# Patient Record
Sex: MALE | Race: WHITE | Employment: UNEMPLOYED | URBAN - METROPOLITAN AREA
[De-identification: names, ages, dates, MRNs, and addresses within clinical notes are randomized per-mention and may not be internally consistent; named-entity substitution may affect disease eponyms.]

---

## 2018-10-03 ENCOUNTER — APPOINTMENT (OUTPATIENT)
Dept: GENERAL RADIOLOGY | Age: 28
End: 2018-10-03
Payer: MEDICAID

## 2018-10-03 ENCOUNTER — HOSPITAL ENCOUNTER (EMERGENCY)
Age: 28
Discharge: HOME | End: 2018-10-03
Payer: MEDICAID

## 2018-10-03 ENCOUNTER — APPOINTMENT (OUTPATIENT)
Dept: ULTRASOUND IMAGING | Age: 28
End: 2018-10-03
Payer: MEDICAID

## 2018-10-03 VITALS
DIASTOLIC BLOOD PRESSURE: 57 MMHG | HEART RATE: 87 BPM | RESPIRATION RATE: 16 BRPM | BODY MASS INDEX: 24.25 KG/M2 | SYSTOLIC BLOOD PRESSURE: 99 MMHG | HEIGHT: 68 IN | TEMPERATURE: 98 F | OXYGEN SATURATION: 97 % | WEIGHT: 160 LBS

## 2018-10-03 DIAGNOSIS — R06.00 DYSPNEA, UNSPECIFIED TYPE: ICD-10-CM

## 2018-10-03 DIAGNOSIS — N43.3 HYDROCELE IN ADULT: ICD-10-CM

## 2018-10-03 DIAGNOSIS — N50.0 BILATERAL TESTICULAR ATROPHY: Primary | ICD-10-CM

## 2018-10-03 DIAGNOSIS — N39.0 URINARY TRACT INFECTION WITHOUT HEMATURIA, SITE UNSPECIFIED: ICD-10-CM

## 2018-10-03 LAB
ALBUMIN SERPL-MCNC: 5.2 G/DL (ref 3.9–4.9)
ALP BLD-CCNC: 79 U/L (ref 35–104)
ALT SERPL-CCNC: 15 U/L (ref 0–41)
AMPHETAMINE SCREEN, URINE: NORMAL
ANION GAP SERPL CALCULATED.3IONS-SCNC: 12 MEQ/L (ref 7–13)
AST SERPL-CCNC: 18 U/L (ref 0–40)
BARBITURATE SCREEN URINE: NORMAL
BASOPHILS ABSOLUTE: 0 K/UL (ref 0–0.2)
BASOPHILS RELATIVE PERCENT: 0.5 %
BENZODIAZEPINE SCREEN, URINE: NORMAL
BILIRUB SERPL-MCNC: 0.4 MG/DL (ref 0–1.2)
BILIRUBIN URINE: NEGATIVE
BLOOD, URINE: NEGATIVE
BUN BLDV-MCNC: 9 MG/DL (ref 6–20)
CALCIUM SERPL-MCNC: 9.7 MG/DL (ref 8.6–10.2)
CANNABINOID SCREEN URINE: NORMAL
CHLORIDE BLD-SCNC: 101 MEQ/L (ref 98–107)
CLARITY: CLEAR
CO2: 29 MEQ/L (ref 22–29)
COCAINE METABOLITE SCREEN URINE: NORMAL
COLOR: YELLOW
CREAT SERPL-MCNC: 0.74 MG/DL (ref 0.7–1.2)
EOSINOPHILS ABSOLUTE: 0.2 K/UL (ref 0–0.7)
EOSINOPHILS RELATIVE PERCENT: 3.6 %
EPITHELIAL CELLS, UA: NORMAL /HPF
ETHANOL PERCENT: NORMAL G/DL
ETHANOL: <10 MG/DL (ref 0–0.08)
GFR AFRICAN AMERICAN: >60
GFR NON-AFRICAN AMERICAN: >60
GLOBULIN: 2.3 G/DL (ref 2.3–3.5)
GLUCOSE BLD-MCNC: 95 MG/DL (ref 74–109)
GLUCOSE URINE: NEGATIVE MG/DL
HCT VFR BLD CALC: 47.9 % (ref 42–52)
HEMOGLOBIN: 16.4 G/DL (ref 14–18)
KETONES, URINE: NEGATIVE MG/DL
LACTIC ACID: 0.7 MMOL/L (ref 0.5–2.2)
LEUKOCYTE ESTERASE, URINE: ABNORMAL
LYMPHOCYTES ABSOLUTE: 2 K/UL (ref 1–4.8)
LYMPHOCYTES RELATIVE PERCENT: 29.6 %
Lab: NORMAL
MCH RBC QN AUTO: 31.9 PG (ref 27–31.3)
MCHC RBC AUTO-ENTMCNC: 34.2 % (ref 33–37)
MCV RBC AUTO: 93.4 FL (ref 80–100)
MONOCYTES ABSOLUTE: 0.6 K/UL (ref 0.2–0.8)
MONOCYTES RELATIVE PERCENT: 9.5 %
NEUTROPHILS ABSOLUTE: 3.8 K/UL (ref 1.4–6.5)
NEUTROPHILS RELATIVE PERCENT: 56.8 %
NITRITE, URINE: NEGATIVE
OPIATE SCREEN URINE: NORMAL
PDW BLD-RTO: 12.5 % (ref 11.5–14.5)
PH UA: 7.5 (ref 5–9)
PHENCYCLIDINE SCREEN URINE: NORMAL
PLATELET # BLD: 202 K/UL (ref 130–400)
POTASSIUM SERPL-SCNC: 3.8 MEQ/L (ref 3.5–5.1)
PROTEIN UA: NEGATIVE MG/DL
RBC # BLD: 5.13 M/UL (ref 4.7–6.1)
RBC UA: NORMAL /HPF (ref 0–2)
RENAL EPITHELIAL, UA: NORMAL /HPF
SODIUM BLD-SCNC: 142 MEQ/L (ref 132–144)
SPECIFIC GRAVITY UA: 1.01 (ref 1–1.03)
TOTAL PROTEIN: 7.5 G/DL (ref 6.4–8.1)
URINE REFLEX TO CULTURE: YES
UROBILINOGEN, URINE: 0.2 E.U./DL
WBC # BLD: 6.7 K/UL (ref 4.8–10.8)
WBC UA: NORMAL /HPF (ref 0–5)

## 2018-10-03 PROCEDURE — 83605 ASSAY OF LACTIC ACID: CPT

## 2018-10-03 PROCEDURE — 87086 URINE CULTURE/COLONY COUNT: CPT

## 2018-10-03 PROCEDURE — 85025 COMPLETE CBC W/AUTO DIFF WBC: CPT

## 2018-10-03 PROCEDURE — 99285 EMERGENCY DEPT VISIT HI MDM: CPT

## 2018-10-03 PROCEDURE — 76870 US EXAM SCROTUM: CPT

## 2018-10-03 PROCEDURE — 2580000003 HC RX 258: Performed by: PHYSICIAN ASSISTANT

## 2018-10-03 PROCEDURE — G0480 DRUG TEST DEF 1-7 CLASSES: HCPCS

## 2018-10-03 PROCEDURE — 87040 BLOOD CULTURE FOR BACTERIA: CPT

## 2018-10-03 PROCEDURE — 80307 DRUG TEST PRSMV CHEM ANLYZR: CPT

## 2018-10-03 PROCEDURE — 80053 COMPREHEN METABOLIC PANEL: CPT

## 2018-10-03 PROCEDURE — 71046 X-RAY EXAM CHEST 2 VIEWS: CPT

## 2018-10-03 PROCEDURE — 36415 COLL VENOUS BLD VENIPUNCTURE: CPT

## 2018-10-03 PROCEDURE — 81001 URINALYSIS AUTO W/SCOPE: CPT

## 2018-10-03 RX ORDER — NITROFURANTOIN 25; 75 MG/1; MG/1
100 CAPSULE ORAL 2 TIMES DAILY
Qty: 10 CAPSULE | Refills: 0 | Status: SHIPPED | OUTPATIENT
Start: 2018-10-03 | End: 2018-10-08

## 2018-10-03 RX ORDER — 0.9 % SODIUM CHLORIDE 0.9 %
1000 INTRAVENOUS SOLUTION INTRAVENOUS ONCE
Status: COMPLETED | OUTPATIENT
Start: 2018-10-03 | End: 2018-10-03

## 2018-10-03 RX ADMIN — SODIUM CHLORIDE 1000 ML: 9 INJECTION, SOLUTION INTRAVENOUS at 10:35

## 2018-10-03 ASSESSMENT — ENCOUNTER SYMPTOMS
ABDOMINAL PAIN: 0
COLOR CHANGE: 0
EYE DISCHARGE: 0
ABDOMINAL DISTENTION: 0
CONSTIPATION: 0
SHORTNESS OF BREATH: 1
RHINORRHEA: 0
SORE THROAT: 0

## 2018-10-03 NOTE — ED PROVIDER NOTES
pain. Negative for difficulty urinating and dysuria. Musculoskeletal: Negative for arthralgias. Skin: Negative for color change. Neurological: Negative for dizziness, syncope, numbness and headaches. Psychiatric/Behavioral: Negative for agitation and confusion. Except as noted above the remainder of the review of systems was reviewed and negative. PAST MEDICAL HISTORY   History reviewed. No pertinent past medical history. SURGICAL HISTORY       Past Surgical History:   Procedure Laterality Date    SINUS SURGERY  2016         CURRENT MEDICATIONS       Previous Medications    No medications on file       ALLERGIES     Patient has no known allergies. FAMILY HISTORY     History reviewed. No pertinent family history. SOCIAL HISTORY       Social History     Social History    Marital status: Single     Spouse name: N/A    Number of children: N/A    Years of education: N/A     Social History Main Topics    Smoking status: Never Smoker    Smokeless tobacco: Never Used    Alcohol use No    Drug use: No    Sexual activity: Not Asked     Other Topics Concern    None     Social History Narrative    None       SCREENINGS    Randolph Coma Scale  Eye Opening: Spontaneous  Best Verbal Response: Oriented  Best Motor Response: Obeys commands  Huey Coma Scale Score: 15        PHYSICAL EXAM    (up to 7 for level 4, 8 or more for level 5)     ED Triage Vitals [10/03/18 0943]   BP Temp Temp Source Pulse Resp SpO2 Height Weight   119/70 97.7 °F (36.5 °C) Temporal 100 18 98 % 5' 8\" (1.727 m) 160 lb (72.6 kg)       Physical Exam   Constitutional: He is oriented to person, place, and time. He appears well-developed and well-nourished. HENT:   Head: Normocephalic. Eyes: Pupils are equal, round, and reactive to light. Neck: Neck supple. No JVD present. No tracheal deviation present. Cardiovascular: Normal rate.     Pulmonary/Chest: Effort normal and breath sounds normal. No respiratory
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
## 2018-10-04 LAB — URINE CULTURE, ROUTINE: NORMAL

## 2018-10-08 LAB
BLOOD CULTURE, ROUTINE: NORMAL
CULTURE, BLOOD 2: NORMAL

## 2019-01-23 ENCOUNTER — OFFICE VISIT (OUTPATIENT)
Dept: INTERNAL MEDICINE CLINIC | Age: 29
End: 2019-01-23
Payer: MEDICAID

## 2019-01-23 VITALS
RESPIRATION RATE: 16 BRPM | OXYGEN SATURATION: 98 % | DIASTOLIC BLOOD PRESSURE: 78 MMHG | TEMPERATURE: 98.7 F | BODY MASS INDEX: 23.88 KG/M2 | HEIGHT: 69 IN | HEART RATE: 78 BPM | SYSTOLIC BLOOD PRESSURE: 120 MMHG | WEIGHT: 161.2 LBS

## 2019-01-23 DIAGNOSIS — K64.9 HEMORRHOIDS, UNSPECIFIED HEMORRHOID TYPE: ICD-10-CM

## 2019-01-23 DIAGNOSIS — K59.09 OTHER CONSTIPATION: Primary | ICD-10-CM

## 2019-01-23 DIAGNOSIS — Z11.4 SCREENING FOR HIV (HUMAN IMMUNODEFICIENCY VIRUS): ICD-10-CM

## 2019-01-23 DIAGNOSIS — E29.1 HYPOGONADISM IN MALE: ICD-10-CM

## 2019-01-23 DIAGNOSIS — R04.0 NASAL BLEEDING: ICD-10-CM

## 2019-01-23 LAB
ESTRADIOL LEVEL: <5 PG/ML
PROLACTIN: 15.1 NG/ML (ref 4–15.2)
TSH SERPL DL<=0.05 MIU/L-ACNC: 3.37 UIU/ML (ref 0.27–4.2)

## 2019-01-23 PROCEDURE — 99204 OFFICE O/P NEW MOD 45 MIN: CPT | Performed by: FAMILY MEDICINE

## 2019-01-23 ASSESSMENT — ENCOUNTER SYMPTOMS
ALLERGIC/IMMUNOLOGIC NEGATIVE: 1
RESPIRATORY NEGATIVE: 1
GASTROINTESTINAL NEGATIVE: 1
SHORTNESS OF BREATH: 0
EYES NEGATIVE: 1

## 2019-01-23 ASSESSMENT — PATIENT HEALTH QUESTIONNAIRE - PHQ9
SUM OF ALL RESPONSES TO PHQ9 QUESTIONS 1 & 2: 0
1. LITTLE INTEREST OR PLEASURE IN DOING THINGS: 0
SUM OF ALL RESPONSES TO PHQ QUESTIONS 1-9: 0
SUM OF ALL RESPONSES TO PHQ QUESTIONS 1-9: 0
2. FEELING DOWN, DEPRESSED OR HOPELESS: 0

## 2019-01-26 LAB
HIV-1 WESTERN BLOT: NEGATIVE
SEX HORMONE BINDING GLOBULIN: 293 NMOL/L (ref 11–80)
TESTOSTERONE FREE PERCENT: 0.3 % (ref 1.6–2.9)
TESTOSTERONE FREE, CALC: 2 PG/ML (ref 47–244)
TESTOSTERONE TOTAL-MALE: 64 NG/DL (ref 300–1080)

## 2019-01-27 LAB — DHEA: 3.53 NG/ML (ref 1.33–7.78)

## 2019-02-06 ENCOUNTER — OFFICE VISIT (OUTPATIENT)
Dept: INTERNAL MEDICINE CLINIC | Age: 29
End: 2019-02-06
Payer: MEDICAID

## 2019-02-06 VITALS
OXYGEN SATURATION: 98 % | SYSTOLIC BLOOD PRESSURE: 128 MMHG | RESPIRATION RATE: 16 BRPM | HEART RATE: 90 BPM | WEIGHT: 163.2 LBS | BODY MASS INDEX: 24.17 KG/M2 | DIASTOLIC BLOOD PRESSURE: 78 MMHG | HEIGHT: 69 IN | TEMPERATURE: 98.6 F

## 2019-02-06 DIAGNOSIS — E29.1 HYPOGONADISM IN MALE: ICD-10-CM

## 2019-02-06 DIAGNOSIS — E29.1 HYPOGONADISM IN MALE: Primary | ICD-10-CM

## 2019-02-06 DIAGNOSIS — J01.10 ACUTE NON-RECURRENT FRONTAL SINUSITIS: ICD-10-CM

## 2019-02-06 LAB
FOLLICLE STIMULATING HORMONE: 3.1 MIU/ML
LUTEINIZING HORMONE: 14.8 MIU/ML

## 2019-02-06 PROCEDURE — 99213 OFFICE O/P EST LOW 20 MIN: CPT | Performed by: FAMILY MEDICINE

## 2019-02-06 RX ORDER — AMOXICILLIN 875 MG/1
875 TABLET, COATED ORAL 2 TIMES DAILY
Qty: 20 TABLET | Refills: 0 | Status: SHIPPED | OUTPATIENT
Start: 2019-02-06 | End: 2019-02-16

## 2019-02-06 ASSESSMENT — ENCOUNTER SYMPTOMS
COUGH: 1
GASTROINTESTINAL NEGATIVE: 1
ALLERGIC/IMMUNOLOGIC NEGATIVE: 1
EYES NEGATIVE: 1
SHORTNESS OF BREATH: 0

## 2019-02-09 LAB
GROWTH HORMONE: 0.08 NG/ML (ref 0.05–3)
HCG TUMOR MARKER: <1 IU/L (ref 0–3)

## 2019-02-25 ENCOUNTER — OFFICE VISIT (OUTPATIENT)
Dept: GASTROENTEROLOGY | Age: 29
End: 2019-02-25
Payer: MEDICAID

## 2019-02-25 VITALS
HEIGHT: 69 IN | HEART RATE: 84 BPM | TEMPERATURE: 97.9 F | SYSTOLIC BLOOD PRESSURE: 120 MMHG | BODY MASS INDEX: 24.88 KG/M2 | WEIGHT: 168 LBS | OXYGEN SATURATION: 99 % | DIASTOLIC BLOOD PRESSURE: 70 MMHG

## 2019-02-25 DIAGNOSIS — K62.5 RECTAL BLEEDING: Primary | ICD-10-CM

## 2019-02-25 DIAGNOSIS — K64.4 SENTINEL TAG: ICD-10-CM

## 2019-02-25 DIAGNOSIS — K60.2 ANAL FISSURE: ICD-10-CM

## 2019-02-25 PROCEDURE — 99204 OFFICE O/P NEW MOD 45 MIN: CPT | Performed by: INTERNAL MEDICINE

## 2019-02-25 RX ORDER — WHEAT DEXTRIN 3 G/3.8 G
15 POWDER (GRAM) ORAL DAILY
Qty: 1 CAN | Refills: 3 | Status: SHIPPED | OUTPATIENT
Start: 2019-02-25 | End: 2019-10-02

## 2019-02-25 RX ORDER — DOCUSATE SODIUM 100 MG/1
200 CAPSULE, LIQUID FILLED ORAL NIGHTLY
Qty: 60 CAPSULE | Refills: 2 | Status: SHIPPED | OUTPATIENT
Start: 2019-02-25 | End: 2019-10-02

## 2019-02-25 RX ORDER — ZINC OXIDE AND COCOA BUTTER 270; 2052 MG/1; MG/1
SUPPOSITORY RECTAL
Qty: 24 SUPPOSITORY | Refills: 3 | Status: SHIPPED | OUTPATIENT
Start: 2019-02-25 | End: 2019-10-02

## 2019-02-26 ENCOUNTER — TELEPHONE (OUTPATIENT)
Dept: GASTROENTEROLOGY | Age: 29
End: 2019-02-26

## 2019-03-28 ENCOUNTER — OFFICE VISIT (OUTPATIENT)
Dept: ENDOCRINOLOGY | Age: 29
End: 2019-03-28
Payer: MEDICAID

## 2019-03-28 VITALS
HEART RATE: 84 BPM | WEIGHT: 163 LBS | SYSTOLIC BLOOD PRESSURE: 103 MMHG | DIASTOLIC BLOOD PRESSURE: 71 MMHG | BODY MASS INDEX: 24.14 KG/M2 | HEIGHT: 69 IN

## 2019-03-28 DIAGNOSIS — N62 GYNECOMASTIA: ICD-10-CM

## 2019-03-28 DIAGNOSIS — E29.1 HYPOGONADISM MALE: ICD-10-CM

## 2019-03-28 DIAGNOSIS — E29.1 HYPOGONADISM IN MALE: ICD-10-CM

## 2019-03-28 DIAGNOSIS — E29.1 HYPOGONADISM IN MALE: Primary | ICD-10-CM

## 2019-03-28 LAB — ESTRADIOL LEVEL: 29 PG/ML

## 2019-03-28 PROCEDURE — G8427 DOCREV CUR MEDS BY ELIG CLIN: HCPCS | Performed by: INTERNAL MEDICINE

## 2019-03-28 PROCEDURE — 1036F TOBACCO NON-USER: CPT | Performed by: INTERNAL MEDICINE

## 2019-03-28 PROCEDURE — G8484 FLU IMMUNIZE NO ADMIN: HCPCS | Performed by: INTERNAL MEDICINE

## 2019-03-28 PROCEDURE — G8420 CALC BMI NORM PARAMETERS: HCPCS | Performed by: INTERNAL MEDICINE

## 2019-03-28 PROCEDURE — 99203 OFFICE O/P NEW LOW 30 MIN: CPT | Performed by: INTERNAL MEDICINE

## 2019-03-31 PROBLEM — N62 GYNECOMASTIA: Status: ACTIVE | Noted: 2019-03-31

## 2019-03-31 PROBLEM — E29.1 HYPOGONADISM MALE: Status: ACTIVE | Noted: 2019-03-31

## 2019-03-31 LAB
SEX HORMONE BINDING GLOBULIN: 172 NMOL/L (ref 11–80)
TESTOSTERONE FREE PERCENT: 0.6 % (ref 1.6–2.9)
TESTOSTERONE FREE, CALC: 41 PG/ML (ref 47–244)
TESTOSTERONE TOTAL-MALE: 731 NG/DL (ref 300–1080)

## 2019-03-31 ASSESSMENT — ENCOUNTER SYMPTOMS: VISUAL CHANGE: 0

## 2019-04-04 ENCOUNTER — OFFICE VISIT (OUTPATIENT)
Dept: BEHAVIORAL/MENTAL HEALTH CLINIC | Age: 29
End: 2019-04-04
Payer: MEDICAID

## 2019-04-04 DIAGNOSIS — F43.23 ADJUSTMENT DISORDER WITH MIXED ANXIETY AND DEPRESSED MOOD: Primary | ICD-10-CM

## 2019-04-04 PROCEDURE — 90791 PSYCH DIAGNOSTIC EVALUATION: CPT | Performed by: PSYCHOLOGIST

## 2019-04-04 PROCEDURE — 1036F TOBACCO NON-USER: CPT | Performed by: PSYCHOLOGIST

## 2019-04-04 ASSESSMENT — PATIENT HEALTH QUESTIONNAIRE - PHQ9
SUM OF ALL RESPONSES TO PHQ9 QUESTIONS 1 & 2: 0
6. FEELING BAD ABOUT YOURSELF - OR THAT YOU ARE A FAILURE OR HAVE LET YOURSELF OR YOUR FAMILY DOWN: 3
7. TROUBLE CONCENTRATING ON THINGS, SUCH AS READING THE NEWSPAPER OR WATCHING TELEVISION: 0
3. TROUBLE FALLING OR STAYING ASLEEP: 0
5. POOR APPETITE OR OVEREATING: 0
8. MOVING OR SPEAKING SO SLOWLY THAT OTHER PEOPLE COULD HAVE NOTICED. OR THE OPPOSITE, BEING SO FIGETY OR RESTLESS THAT YOU HAVE BEEN MOVING AROUND A LOT MORE THAN USUAL: 0
SUM OF ALL RESPONSES TO PHQ QUESTIONS 1-9: 5
10. IF YOU CHECKED OFF ANY PROBLEMS, HOW DIFFICULT HAVE THESE PROBLEMS MADE IT FOR YOU TO DO YOUR WORK, TAKE CARE OF THINGS AT HOME, OR GET ALONG WITH OTHER PEOPLE: 1
4. FEELING TIRED OR HAVING LITTLE ENERGY: 2
2. FEELING DOWN, DEPRESSED OR HOPELESS: 0
1. LITTLE INTEREST OR PLEASURE IN DOING THINGS: 0
SUM OF ALL RESPONSES TO PHQ QUESTIONS 1-9: 5
9. THOUGHTS THAT YOU WOULD BE BETTER OFF DEAD, OR OF HURTING YOURSELF: 0
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
## 2019-04-11 ENCOUNTER — OFFICE VISIT (OUTPATIENT)
Dept: BEHAVIORAL/MENTAL HEALTH CLINIC | Age: 29
End: 2019-04-11
Payer: MEDICAID

## 2019-04-11 DIAGNOSIS — F43.23 ADJUSTMENT DISORDER WITH MIXED ANXIETY AND DEPRESSED MOOD: Primary | ICD-10-CM

## 2019-04-11 PROCEDURE — 90832 PSYTX W PT 30 MINUTES: CPT | Performed by: PSYCHOLOGIST

## 2019-04-11 PROCEDURE — 1036F TOBACCO NON-USER: CPT | Performed by: PSYCHOLOGIST

## 2019-04-11 ASSESSMENT — PATIENT HEALTH QUESTIONNAIRE - PHQ9
2. FEELING DOWN, DEPRESSED OR HOPELESS: 0
10. IF YOU CHECKED OFF ANY PROBLEMS, HOW DIFFICULT HAVE THESE PROBLEMS MADE IT FOR YOU TO DO YOUR WORK, TAKE CARE OF THINGS AT HOME, OR GET ALONG WITH OTHER PEOPLE: 0
4. FEELING TIRED OR HAVING LITTLE ENERGY: 0
8. MOVING OR SPEAKING SO SLOWLY THAT OTHER PEOPLE COULD HAVE NOTICED. OR THE OPPOSITE, BEING SO FIGETY OR RESTLESS THAT YOU HAVE BEEN MOVING AROUND A LOT MORE THAN USUAL: 0
SUM OF ALL RESPONSES TO PHQ QUESTIONS 1-9: 2
SUM OF ALL RESPONSES TO PHQ9 QUESTIONS 1 & 2: 0
7. TROUBLE CONCENTRATING ON THINGS, SUCH AS READING THE NEWSPAPER OR WATCHING TELEVISION: 0
6. FEELING BAD ABOUT YOURSELF - OR THAT YOU ARE A FAILURE OR HAVE LET YOURSELF OR YOUR FAMILY DOWN: 0
9. THOUGHTS THAT YOU WOULD BE BETTER OFF DEAD, OR OF HURTING YOURSELF: 0
5. POOR APPETITE OR OVEREATING: 1
SUM OF ALL RESPONSES TO PHQ QUESTIONS 1-9: 2
1. LITTLE INTEREST OR PLEASURE IN DOING THINGS: 0
3. TROUBLE FALLING OR STAYING ASLEEP: 1
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
## 2019-04-18 ENCOUNTER — OFFICE VISIT (OUTPATIENT)
Dept: ENDOCRINOLOGY | Age: 29
End: 2019-04-18
Payer: MEDICAID

## 2019-04-18 ENCOUNTER — OFFICE VISIT (OUTPATIENT)
Dept: BEHAVIORAL/MENTAL HEALTH CLINIC | Age: 29
End: 2019-04-18
Payer: MEDICAID

## 2019-04-18 ENCOUNTER — HOSPITAL ENCOUNTER (OUTPATIENT)
Dept: WOMENS IMAGING | Age: 29
Discharge: HOME | End: 2019-04-20
Payer: MEDICAID

## 2019-04-18 VITALS
WEIGHT: 162 LBS | DIASTOLIC BLOOD PRESSURE: 67 MMHG | HEIGHT: 69 IN | BODY MASS INDEX: 23.99 KG/M2 | SYSTOLIC BLOOD PRESSURE: 100 MMHG | HEART RATE: 78 BPM

## 2019-04-18 DIAGNOSIS — F43.23 ADJUSTMENT DISORDER WITH MIXED ANXIETY AND DEPRESSED MOOD: Primary | ICD-10-CM

## 2019-04-18 DIAGNOSIS — N62 GYNECOMASTIA: Primary | ICD-10-CM

## 2019-04-18 DIAGNOSIS — E29.1 HYPOGONADISM IN MALE: ICD-10-CM

## 2019-04-18 PROCEDURE — 99213 OFFICE O/P EST LOW 20 MIN: CPT | Performed by: INTERNAL MEDICINE

## 2019-04-18 PROCEDURE — 90832 PSYTX W PT 30 MINUTES: CPT | Performed by: PSYCHOLOGIST

## 2019-04-18 PROCEDURE — G8427 DOCREV CUR MEDS BY ELIG CLIN: HCPCS | Performed by: INTERNAL MEDICINE

## 2019-04-18 PROCEDURE — 1036F TOBACCO NON-USER: CPT | Performed by: PSYCHOLOGIST

## 2019-04-18 PROCEDURE — G8420 CALC BMI NORM PARAMETERS: HCPCS | Performed by: INTERNAL MEDICINE

## 2019-04-18 PROCEDURE — 1036F TOBACCO NON-USER: CPT | Performed by: INTERNAL MEDICINE

## 2019-04-18 PROCEDURE — 77080 DXA BONE DENSITY AXIAL: CPT

## 2019-04-18 ASSESSMENT — PATIENT HEALTH QUESTIONNAIRE - PHQ9
5. POOR APPETITE OR OVEREATING: 0
8. MOVING OR SPEAKING SO SLOWLY THAT OTHER PEOPLE COULD HAVE NOTICED. OR THE OPPOSITE, BEING SO FIGETY OR RESTLESS THAT YOU HAVE BEEN MOVING AROUND A LOT MORE THAN USUAL: 0
2. FEELING DOWN, DEPRESSED OR HOPELESS: 0
6. FEELING BAD ABOUT YOURSELF - OR THAT YOU ARE A FAILURE OR HAVE LET YOURSELF OR YOUR FAMILY DOWN: 0
SUM OF ALL RESPONSES TO PHQ QUESTIONS 1-9: 0
1. LITTLE INTEREST OR PLEASURE IN DOING THINGS: 0
10. IF YOU CHECKED OFF ANY PROBLEMS, HOW DIFFICULT HAVE THESE PROBLEMS MADE IT FOR YOU TO DO YOUR WORK, TAKE CARE OF THINGS AT HOME, OR GET ALONG WITH OTHER PEOPLE: 0
3. TROUBLE FALLING OR STAYING ASLEEP: 0
SUM OF ALL RESPONSES TO PHQ QUESTIONS 1-9: 0
7. TROUBLE CONCENTRATING ON THINGS, SUCH AS READING THE NEWSPAPER OR WATCHING TELEVISION: 0
9. THOUGHTS THAT YOU WOULD BE BETTER OFF DEAD, OR OF HURTING YOURSELF: 0
SUM OF ALL RESPONSES TO PHQ9 QUESTIONS 1 & 2: 0
4. FEELING TIRED OR HAVING LITTLE ENERGY: 0

## 2019-04-18 ASSESSMENT — ENCOUNTER SYMPTOMS: VISUAL CHANGE: 0

## 2019-04-18 NOTE — PROGRESS NOTES
Subjective:      Patient ID: Janelle Flores is a 30 y.o. male. Follow-up on hypogonadism gynecomastia  Other   This is a chronic (hypogonadism) problem. The current episode started more than 1 year ago. The problem has been waxing and waning. Pertinent negatives include no visual change. Nothing aggravates the symptoms. He has tried nothing for the symptoms. History of gynecomastia since puberty       Initial  testosterone level low rpt testosterone level normal   According to patient testosterone levels have been fluctuating his estrogen level was low    Results for Gamal Head (MRN [id number]) as of 4/18/2019 13:00   Ref. Range 1/23/2019 15:03 3/28/2019 13:57   Total Testosterone Latest Ref Range: 300 - 1080 ng/dL 64 (L) 731   Results for Gamal Head (MRN [id number]) as of 4/18/2019 13:00   Ref. Range 1/23/2019 15:03 3/28/2019 13:57   Estradiol Latest Units: pg/mL <5 29         According to patient she was to proceed with estrogen replacement reviewed his bone density  Done in 2019 T-scores -0.7 and -0.8 at femur normal  T score was normal at lumbar spine at 0.6    Also reviewed his testicular ultrasound done beginning of the year which was normal    EXAMINATION: US SCROTUM AND TESTICLES       CLINICAL HISTORY: 33year-old who complains of sharp scrotal pain which lasts several minutes. Symptoms occurred today. Pain is intermittent over the last 2 years.       COMPARISONS: None available.       FINDINGS: Scrotal ultrasonography was performed. Both testes are normal in size and echogenicity. Right testis measures 4.3 x 3 x 2.0 cm and left testes measures 4.5 x 3.1 x 2.0 cm. There are no intratesticular masses. Symmetric color flow with normal    low resistive arterial and venous waveforms are documented within both testes.       There is a 5 x 4 mm right epididymal head appendix which does contain a few coarse calcifications.  Right epididymis otherwise demonstrates normal echogenicity and morphology. No definite right epididymal hyperemia. There is a small right hydrocele.       There is a 4 mm cyst in the left epididymal head. Left epididymis otherwise demonstrates normal echogenicity morphology. No left epididymal hyperemia or left hydrocele.               Impression   NO ULTRASOUND SIGNS OF TESTICULAR MASS OR TORSION.       RIGHT EPIDIDYMAL HEAD  APPENDIX WITH A FEW COARSE CALCIFICATIONS.  OTHERWISE UNREMARKABLE EPIDIDYMAL ULTRASOUND. SMALL RIGHT HYDROCELE. Patient has been seeing psychologist due to his different issues    Patient Active Problem List   Diagnosis    Gynecomastia    Hypogonadism male       No Known Allergies      Current Outpatient Medications:     estrogens, conjugated, (PREMARIN) 0.3 MG tablet, Take 1 tablet by mouth daily Once a day, Disp: 30 tablet, Rfl: 3    Wheat Dextrin (BENEFIBER) POWD, Take 15 g by mouth daily, Disp: 1 Can, Rfl: 3    docusate sodium (COLACE) 100 MG capsule, Take 2 capsules by mouth nightly, Disp: 60 capsule, Rfl: 2    Rectal Protectant-Emollient (CALMOL-4) 76-10 % SUPP, 1 to 2 times per day preferably after BM, Disp: 24 suppository, Rfl: 3      Review of Systems   Endocrine: Negative. Psychiatric/Behavioral: Positive for dysphoric mood. All other systems reviewed and are negative. Vitals:    04/18/19 1023   BP: 100/67   Pulse: 78   Weight: 162 lb (73.5 kg)   Height: 5' 9\" (1.753 m)       Objective:   Physical Exam   Constitutional: He appears well-developed and well-nourished. HENT:   Head: Normocephalic. Neck: Neck supple. Cardiovascular: Normal rate. Musculoskeletal: Normal range of motion. Neurological: He is alert. Psychiatric: He has a normal mood and affect. Assessment:       Diagnosis Orders   1. Gynecomastia  Testosterone Free And Total Male    Estradiol    US PELVIS COMPLETE   2.  Hypogonadism in male             Plan:      Orders Placed This Encounter   Procedures    US PELVIS COMPLETE     Standing

## 2019-04-18 NOTE — PROGRESS NOTES
Behavioral Health Consultation  Maria Dolores Moss PsyD. Psychologist  4/18/19  9:57 AM      Time spent with Patient: 20 minutes  This is patient's third  [facility] appointment. Reason for Consult:  stress and adjustment issues  Referring Provider: Carlos Thomas MD  [address] , [address] [facility]    Feedback given to PCP. S:  Pt reports that they've been doing fine. Pt had a conversation with their family and friends about their decision and states everyone has been supportive although their mother has stated a preference for the pt to stay neutral or more masculine then feminine. We discussed further today preferred pronouns (they/them) and if they are going to change their name. Pt reports that they may change their name in the future but this is mainly bc this is their father's name and they do not like their father. They report being confident in their decision and will discuss this further with Dr. Joby Jimenez today. No SI/HI.       O:  MSE:    Appearance    alert, cooperative  Appetite normal  Sleep disturbance No  Fatigue No  Loss of pleasure No  Impulsive behavior No  Speech    spontaneous, normal rate and normal volume  Mood   neutral   Affect    normal affect  Thought Content    intact  Thought Process    linear, goal directed and coherent  Associations    logical connections  Insight    good  Judgment    good  Orientation    oriented to person, place, time, and general circumstances  Memory    recent and remote memory intact  Attention/Concentration    intact  Morbid ideation No  Suicide Assessment    no suicidal ideation      History:    Medications:   Current Outpatient Medications   Medication Sig Dispense Refill    Wheat Dextrin (BENEFIBER) POWD Take 15 g by mouth daily 1 Can 3    docusate sodium (COLACE) 100 MG capsule Take 2 capsules by mouth nightly 60 capsule 2    Rectal Protectant-Emollient (CALMOL-4) 76-10 % SUPP 1 to 2 times per day preferably after BM 24 suppository 3     No current facility-administered medications for this visit. Social History:   Social History     Socioeconomic History    Marital status: Single     Spouse name: Not on file    Number of children: Not on file    Years of education: Not on file    Highest education level: Not on file   Occupational History    Not on file   Social Needs    Financial resource strain: Not on file    Food insecurity:     Worry: Not on file     Inability: Not on file    Transportation needs:     Medical: Not on file     Non-medical: Not on file   Tobacco Use    Smoking status: Never Smoker    Smokeless tobacco: Never Used   Substance and Sexual Activity    Alcohol use: Yes    Drug use: No    Sexual activity: Not Currently     Partners: Female   Lifestyle    Physical activity:     Days per week: Not on file     Minutes per session: Not on file    Stress: Not on file   Relationships    Social connections:     Talks on phone: Not on file     Gets together: Not on file     Attends Jewish service: Not on file     Active member of club or organization: Not on file     Attends meetings of clubs or organizations: Not on file     Relationship status: Not on file    Intimate partner violence:     Fear of current or ex partner: Not on file     Emotionally abused: Not on file     Physically abused: Not on file     Forced sexual activity: Not on file   Other Topics Concern    Not on file   Social History Narrative    Not on file       Family History:   Family History   Problem Relation Age of Onset    Pancreatic Cancer Maternal Grandfather     Prostate Cancer Maternal Grandfather     Diabetes Maternal Grandfather     Diabetes Paternal Grandmother        TOBACCO:   reports that he has never smoked. He has never used smokeless tobacco.  ETOH:   reports that he drinks alcohol.      A:  Administered the PHQ-9, scores indicate a 2 point reduction in symptoms, symptoms have reduced from the minimal depression range to no symptoms of depression. Pt would likely benefit from continued [facility] services to further process gender identity and to help with relapse prevention of symptoms. We are reducing session frequency due to symptom improvement as well as related to the pt gaining more confidence in his decision. PHQ Scores 4/18/2019 4/11/2019 4/4/2019 1/23/2019   PHQ2 Score 0 0 0 0   PHQ9 Score 0 2 5 0     Interpretation of Total Score Depression Severity: 1-4 = Minimal depression, 5-9 = Mild depression, 10-14 = Moderate depression, 15-19 = Moderately severe depression, 20-27 = Severe depression      Diagnosis:  Adjustment disorder with mixed anxiety and depressed mood    Plan:  Pt interventions:  [place]-setting to identify pt's primary goals for [facility] visit / overall health, Supportive techniques, Emphasized self-care as important for managing overall health and Problem-solving re: decision making related to gender identity. Pt Behavioral Change Plan:  Return in 1 month. Please note this report has been partially produced using speech recognition software and may cause contain errors related to that system including grammar, punctuation and spelling as well as words and phrases that may seem inappropriate. If there are questions or concerns please feel free to contact me to clarify.

## 2019-04-19 ENCOUNTER — OFFICE VISIT (OUTPATIENT)
Dept: ENDOCRINOLOGY | Age: 29
End: 2019-04-19
Payer: MEDICAID

## 2019-04-19 ENCOUNTER — OFFICE VISIT (OUTPATIENT)
Dept: INTERNAL MEDICINE CLINIC | Age: 29
End: 2019-04-19
Payer: MEDICAID

## 2019-04-19 VITALS
DIASTOLIC BLOOD PRESSURE: 76 MMHG | RESPIRATION RATE: 18 BRPM | HEART RATE: 62 BPM | TEMPERATURE: 98.2 F | WEIGHT: 162 LBS | BODY MASS INDEX: 23.99 KG/M2 | HEIGHT: 69 IN | SYSTOLIC BLOOD PRESSURE: 112 MMHG | OXYGEN SATURATION: 98 %

## 2019-04-19 VITALS
SYSTOLIC BLOOD PRESSURE: 124 MMHG | WEIGHT: 162 LBS | HEIGHT: 69 IN | HEART RATE: 75 BPM | DIASTOLIC BLOOD PRESSURE: 76 MMHG | BODY MASS INDEX: 23.99 KG/M2

## 2019-04-19 DIAGNOSIS — F64.9 GENDER DYSPHORIA: ICD-10-CM

## 2019-04-19 DIAGNOSIS — F64.0 GENDER IDENTITY DISORDER IN ADULT: ICD-10-CM

## 2019-04-19 DIAGNOSIS — E29.1 HYPOGONADISM IN MALE: Primary | ICD-10-CM

## 2019-04-19 DIAGNOSIS — N62 GYNECOMASTIA: Primary | ICD-10-CM

## 2019-04-19 LAB — HIV AG/AB: NORMAL

## 2019-04-19 PROCEDURE — G8420 CALC BMI NORM PARAMETERS: HCPCS | Performed by: INTERNAL MEDICINE

## 2019-04-19 PROCEDURE — G8427 DOCREV CUR MEDS BY ELIG CLIN: HCPCS | Performed by: FAMILY MEDICINE

## 2019-04-19 PROCEDURE — 1036F TOBACCO NON-USER: CPT | Performed by: FAMILY MEDICINE

## 2019-04-19 PROCEDURE — G8427 DOCREV CUR MEDS BY ELIG CLIN: HCPCS | Performed by: INTERNAL MEDICINE

## 2019-04-19 PROCEDURE — G8420 CALC BMI NORM PARAMETERS: HCPCS | Performed by: FAMILY MEDICINE

## 2019-04-19 PROCEDURE — 99213 OFFICE O/P EST LOW 20 MIN: CPT | Performed by: INTERNAL MEDICINE

## 2019-04-19 PROCEDURE — 1036F TOBACCO NON-USER: CPT | Performed by: INTERNAL MEDICINE

## 2019-04-19 PROCEDURE — 99213 OFFICE O/P EST LOW 20 MIN: CPT | Performed by: FAMILY MEDICINE

## 2019-04-19 ASSESSMENT — ENCOUNTER SYMPTOMS
RESPIRATORY NEGATIVE: 1
EYES NEGATIVE: 1
SHORTNESS OF BREATH: 0
GASTROINTESTINAL NEGATIVE: 1
ALLERGIC/IMMUNOLOGIC NEGATIVE: 1

## 2019-04-19 NOTE — PROGRESS NOTES
Subjective:      Patient ID: Dennis Doherty is a 30 y.o. male. Pt seen yesterday   Other   This is a chronic (hypogonadism) problem. The current episode started more than 1 year ago. The problem has been waxing and waning. He has tried nothing for the symptoms. Had questions regarding hormone replacements       Patient Active Problem List   Diagnosis    Gynecomastia    Hypogonadism male       No Known Allergies      Current Outpatient Medications:     estrogens, conjugated, (PREMARIN) 0.3 MG tablet, Take 1 tablet by mouth daily Once a day, Disp: 30 tablet, Rfl: 3    Wheat Dextrin (BENEFIBER) POWD, Take 15 g by mouth daily, Disp: 1 Can, Rfl: 3    docusate sodium (COLACE) 100 MG capsule, Take 2 capsules by mouth nightly, Disp: 60 capsule, Rfl: 2    Rectal Protectant-Emollient (CALMOL-4) 76-10 % SUPP, 1 to 2 times per day preferably after BM, Disp: 24 suppository, Rfl: 3      Review of Systems   Endocrine: Negative. Psychiatric/Behavioral: Positive for dysphoric mood. All other systems reviewed and are negative. Vitals:    04/19/19 1258   BP: 124/76   Site: Left Upper Arm   Position: Sitting   Cuff Size: Medium Adult   Pulse: 75   Weight: 162 lb (73.5 kg)   Height: 5' 9\" (1.753 m)       Objective:   Physical Exam   Constitutional: He appears well-developed and well-nourished. HENT:   Head: Normocephalic. Neck: Neck supple. Cardiovascular: Normal rate. Musculoskeletal: Normal range of motion. Neurological: He is alert. Psychiatric: He has a normal mood and affect. Assessment:       Diagnosis Orders   1. Gynecomastia     2.  Gender dysphoria             Plan:      Continue premarin 0.3 mg daily             Elsie Blandon MD

## 2019-04-19 NOTE — PROGRESS NOTES
Patient is seen in follow up for   Chief Complaint   Patient presents with    Referral - General     Patient was advised per Dr. Garald Najjar to come see his PCP to get a referral for Orchiectomy.  Health Maintenance     declines      HPITransioning from male to female. Past Medical History:   Diagnosis Date    Testicular atrophy      Patient Active Problem List    Diagnosis Date Noted    Gynecomastia 03/31/2019    Hypogonadism male 03/31/2019     Past Surgical History:   Procedure Laterality Date    SINUS SURGERY  2016     Family History   Problem Relation Age of Onset    Pancreatic Cancer Maternal Grandfather     Prostate Cancer Maternal Grandfather     Diabetes Maternal Grandfather     Diabetes Paternal Grandmother      Social History     Socioeconomic History    Marital status: Single     Spouse name: None    Number of children: None    Years of education: None    Highest education level: None   Occupational History    None   Social Needs    Financial resource strain: None    Food insecurity:     Worry: None     Inability: None    Transportation needs:     Medical: None     Non-medical: None   Tobacco Use    Smoking status: Never Smoker    Smokeless tobacco: Never Used   Substance and Sexual Activity    Alcohol use:  Yes    Drug use: No    Sexual activity: Not Currently     Partners: Female   Lifestyle    Physical activity:     Days per week: None     Minutes per session: None    Stress: None   Relationships    Social connections:     Talks on phone: None     Gets together: None     Attends Muslim service: None     Active member of club or organization: None     Attends meetings of clubs or organizations: None     Relationship status: None    Intimate partner violence:     Fear of current or ex partner: None     Emotionally abused: None     Physically abused: None     Forced sexual activity: None   Other Topics Concern    None   Social History Narrative    None     Current Outpatient Medications   Medication Sig Dispense Refill    estrogens, conjugated, (PREMARIN) 0.3 MG tablet Take 1 tablet by mouth daily Once a day 30 tablet 3    Wheat Dextrin (BENEFIBER) POWD Take 15 g by mouth daily 1 Can 3    docusate sodium (COLACE) 100 MG capsule Take 2 capsules by mouth nightly 60 capsule 2    Rectal Protectant-Emollient (CALMOL-4) 76-10 % SUPP 1 to 2 times per day preferably after BM 24 suppository 3     No current facility-administered medications for this visit. Current Outpatient Medications on File Prior to Visit   Medication Sig Dispense Refill    estrogens, conjugated, (PREMARIN) 0.3 MG tablet Take 1 tablet by mouth daily Once a day 30 tablet 3    Wheat Dextrin (BENEFIBER) POWD Take 15 g by mouth daily 1 Can 3    docusate sodium (COLACE) 100 MG capsule Take 2 capsules by mouth nightly 60 capsule 2    Rectal Protectant-Emollient (CALMOL-4) 76-10 % SUPP 1 to 2 times per day preferably after BM 24 suppository 3     No current facility-administered medications on file prior to visit. No Known Allergies  Health Maintenance   Topic Date Due    Varicella Vaccine (1 of 2 - 13+ 2-dose series) 04/03/2004    Flu vaccine (Season Ended) 01/23/2020 (Originally 9/1/2019)    DTaP/Tdap/Td vaccine (2 - Td) 04/01/2026    HIV screen  Completed    Pneumococcal 0-64 years Vaccine  Aged Out       Review of Systems     Review of Systems   Constitutional: Negative for activity change, appetite change, chills, fever and unexpected weight change. HENT: Negative. Eyes: Negative. Respiratory: Negative. Negative for shortness of breath. Cardiovascular: Negative. Negative for chest pain and palpitations. Gastrointestinal: Negative. Endocrine: Negative. Genitourinary: Negative. Musculoskeletal: Negative. Skin: Negative. Allergic/Immunologic: Negative. Neurological: Negative. Hematological: Negative. Psychiatric/Behavioral: Negative.         Physical Exam  Vitals: 04/19/19 1453   BP: 112/76   Site: Left Upper Arm   Position: Sitting   Cuff Size: Large Adult   Pulse: 62   Resp: 18   Temp: 98.2 °F (36.8 °C)   TempSrc: Oral   SpO2: 98%   Weight: 162 lb (73.5 kg)   Height: 5' 9\" (1.753 m)       Physical Exam   Constitutional: He is oriented to person, place, and time. He appears well-developed and well-nourished. HENT:   Right Ear: External ear normal.   Left Ear: External ear normal.   Eyes: Pupils are equal, round, and reactive to light. Conjunctivae and EOM are normal.   Neck: Normal range of motion. Neck supple. No thyromegaly present. Cardiovascular: Normal rate, regular rhythm, normal heart sounds and intact distal pulses. Exam reveals no gallop and no friction rub. No murmur heard. Pulmonary/Chest: Effort normal and breath sounds normal. No respiratory distress. He has no wheezes. Abdominal: Soft. Bowel sounds are normal. He exhibits no distension and no mass. There is no tenderness. There is no rebound and no guarding. No hernia. Genitourinary: Penis normal.   Musculoskeletal: Normal range of motion. He exhibits no edema or tenderness. Lymphadenopathy:     He has no cervical adenopathy. Neurological: He is alert and oriented to person, place, and time. No cranial nerve deficit. Coordination normal.   Skin: Skin is warm and dry. Psychiatric: He has a normal mood and affect. Assessment   Diagnosis Orders   1. Hypogonadism in male  Amb External Referral To Endocrinology   2.  Gender identity disorder in adult  Amb External Referral To Endocrinology     Problem List     None          Plan  Orders Placed This Encounter   Procedures    HIV Screen     This order was created through External Result Entry    Amb External Referral To Endocrinology     Referral Priority:   Routine     Referral Type:   Consult for Advice and Opinion     Requested Specialty:   Endocrinology     Number of Visits Requested:   1     No orders of the defined types were placed in this encounter. No follow-ups on file.   Michelle Mcintosh MD

## 2019-04-22 DIAGNOSIS — N62 GYNECOMASTIA: ICD-10-CM

## 2019-04-22 LAB — ESTRADIOL LEVEL: 25 PG/ML

## 2019-04-24 LAB
SEX HORMONE BINDING GLOBULIN: 227 NMOL/L (ref 11–80)
TESTOSTERONE FREE PERCENT: 0.4 % (ref 1.6–2.9)
TESTOSTERONE FREE, CALC: 24 PG/ML (ref 47–244)
TESTOSTERONE TOTAL-MALE: 573 NG/DL (ref 300–1080)

## 2019-04-30 ENCOUNTER — HOSPITAL ENCOUNTER (OUTPATIENT)
Dept: ULTRASOUND IMAGING | Age: 29
Discharge: HOME | End: 2019-05-02
Payer: MEDICAID

## 2019-04-30 DIAGNOSIS — N62 GYNECOMASTIA: ICD-10-CM

## 2019-04-30 PROCEDURE — 76856 US EXAM PELVIC COMPLETE: CPT

## 2019-05-01 ENCOUNTER — OFFICE VISIT (OUTPATIENT)
Dept: GASTROENTEROLOGY | Age: 29
End: 2019-05-01
Payer: MEDICAID

## 2019-05-01 VITALS
TEMPERATURE: 97.6 F | SYSTOLIC BLOOD PRESSURE: 116 MMHG | BODY MASS INDEX: 24.59 KG/M2 | DIASTOLIC BLOOD PRESSURE: 70 MMHG | HEIGHT: 69 IN | HEART RATE: 68 BPM | WEIGHT: 166 LBS | OXYGEN SATURATION: 96 %

## 2019-05-01 DIAGNOSIS — K62.5 RECTAL BLEEDING: Primary | ICD-10-CM

## 2019-05-01 DIAGNOSIS — K58.1 IRRITABLE BOWEL SYNDROME WITH CONSTIPATION: ICD-10-CM

## 2019-05-01 DIAGNOSIS — R10.11 RUQ PAIN: ICD-10-CM

## 2019-05-01 DIAGNOSIS — R10.84 GENERALIZED ABDOMINAL CRAMPS: ICD-10-CM

## 2019-05-01 DIAGNOSIS — K60.2 ANAL FISSURE: ICD-10-CM

## 2019-05-01 PROCEDURE — G8427 DOCREV CUR MEDS BY ELIG CLIN: HCPCS | Performed by: NURSE PRACTITIONER

## 2019-05-01 PROCEDURE — 1036F TOBACCO NON-USER: CPT | Performed by: NURSE PRACTITIONER

## 2019-05-01 PROCEDURE — G8420 CALC BMI NORM PARAMETERS: HCPCS | Performed by: NURSE PRACTITIONER

## 2019-05-01 PROCEDURE — 99214 OFFICE O/P EST MOD 30 MIN: CPT | Performed by: NURSE PRACTITIONER

## 2019-05-01 RX ORDER — SODIUM, POTASSIUM,MAG SULFATES 17.5-3.13G
SOLUTION, RECONSTITUTED, ORAL ORAL
Qty: 1 BOTTLE | Refills: 0 | Status: SHIPPED | OUTPATIENT
Start: 2019-05-01 | End: 2019-10-02 | Stop reason: ALTCHOICE

## 2019-05-01 RX ORDER — POLYETHYLENE GLYCOL 3350 17 G/17G
17 POWDER, FOR SOLUTION ORAL DAILY
Qty: 510 G | Refills: 3 | Status: SHIPPED | OUTPATIENT
Start: 2019-05-01 | End: 2019-08-29

## 2019-05-01 NOTE — PROGRESS NOTES
Gastroenterology Clinic Follow up Visit    Chelita Blackburn  [id number]  Chief Complaint   Patient presents with    Follow-up     Background:29 y.o. male last seen in GI clinic on 2/25/19 with complaints of rectal bleeding and pain. Patient also dealing with constipation since 2016. Rectal exam showed anal fissure with sentinel tag and tenderness. Patient  Encouraged to avoid constipation. Thorough instruction and education provided to him. Rx provided for topical Nifedipine/Xylocaine ointment. Interval change: Patient presents to the GI clinic with ongoing complaints of rectal bleeding despite using ointments that was prescribed to him. Patient reports the skin tag has reduced in size, but he feels a lump in his rectum that has grown over the past few months. Patient denies any rectal trauma. Patient reports rectal pain with defecation. He reports rectal bleeding with every bowel movement. Patient has never undergone any endoscopic evaluation in the past.  Patient reports his grandfather colon cancer. Patient reports ongoing constipation. He reports he has a bowel movement every 3-4 days accompanied by generalized abdominal pain. Patient reports he strains to have a hard dry stool. He has not used any over-the-counter medication up with this. Patient reports he just increases his water intake. Patient reports the rectal bleeding gives him a lot of anxiety and is worried he has colon cancer. Patient is requesting  a colonoscopy to rule malignancy out. Patient with a new complaint of right upper quadrant pain that started about 2 weeks ago. Patient denies drug or alcohol use. Patient describes the pain as sharp in nature but intermittent. No aggravating factors. Patient reports he started taking milk thistle which has relieved his pain. Patient denies unintentional weight loss or loss of appetite. Patient denies melena or hematemesis.   He denies shortness of breath, chest pain, or palpitations. Review of Systems   All other systems reviewed and are negative. Past medical history, past surgical history, medication list, social and familyhistory reviewed    Blood pressure 116/70, pulse 68, temperature 97.6 °F (36.4 °C), height 5' 9\" (1.753 m), weight 166 lb (75.3 kg), SpO2 96 %. Physical Exam   Constitutional: He is oriented to person, place, and time. He appears well-developed and well-nourished. No distress. HENT:   Head: Normocephalic and atraumatic. Eyes: Pupils are equal, round, and reactive to light. Conjunctivae and EOM are normal. No scleral icterus. Neck: Normal range of motion. Neck supple. Cardiovascular: Normal rate and regular rhythm. Pulmonary/Chest: Effort normal and breath sounds normal. No respiratory distress. He has no wheezes. He has no rales. He exhibits no tenderness. Abdominal: Soft. Bowel sounds are normal. He exhibits no distension and no mass. There is no tenderness. There is no rebound and no guarding. Genitourinary:   Genitourinary Comments: deferred   Musculoskeletal: Normal range of motion. Lymphadenopathy:     He has no cervical adenopathy. Neurological: He is alert and oriented to person, place, and time. Skin: No rash noted. He is not diaphoretic. No erythema. No pallor. Psychiatric: He has a normal mood and affect. His behavior is normal. Judgment and thought content normal.   Vitals reviewed. Laboratory, Pathology, Radiology reviewed in detail with relevantimportant investigations summarized below:    No results for input(s): WBC, HGB, HCT, MCV, PLT in the last 720 hours. Lab Results   Component Value Date    ALT 15 10/03/2018    AST 18 10/03/2018    ALKPHOS 79 10/03/2018    BILITOT 0.4 10/03/2018       Assessment and Plan:  Jake Willson III 30 y.o. male for follow up. Patient presents to the GI clinic with a new symptom of right upper quadrant pain. Patient taking milk thistle, which has helped relieve his pain.   Will proceed with abdominal ultrasound and hepatic function test. NAFLD in differential.  Patient with ongoing rectal bleeding with each bowel movement. His last rectal exam showed anal fissure. No relief of symptoms with nifedipine/Xylocaine ointment. - Colonoscopy is indicated, procedure was discussed at length, including the risks and benefits. Split prep was prescribed and discussed. Importance of the prep in ensuring a good exam was emphasized. Suprep sample provided. Patient was urged to maintain a daily bowel regimen to avoid constipation. Thorough education provided to him. Diet and nutrition discussed with the patient, all to pull examples provided to him.  >50% of this visit was spent on education/ counseling the patient. Return in about 8 weeks (around 6/26/2019). LUCY Hinsno - [facility]    Please note this report has been partially produced using speech recognitionsoftware  and may cause contain errors related to that system including grammar, punctuation and spelling as well as words andphrases that may seem inappropriate. If there are questions or concerns please feel free to contact me to clarify.

## 2019-05-01 NOTE — PATIENT INSTRUCTIONS
Daily Bowel Regimen:  MiraLAX: 1 cap daily. Decrease to 1/2 the dose if stool becomes too loose. Benefiber: 2-3 tsp daily with adequate fluid intake.

## 2019-05-02 DIAGNOSIS — R10.11 RUQ PAIN: ICD-10-CM

## 2019-05-02 LAB
ALBUMIN SERPL-MCNC: 4.3 G/DL (ref 3.5–4.6)
ALP BLD-CCNC: 81 U/L (ref 35–104)
ALT SERPL-CCNC: 13 U/L (ref 0–41)
AST SERPL-CCNC: 15 U/L (ref 0–40)
BILIRUB SERPL-MCNC: <0.2 MG/DL (ref 0.2–0.7)
BILIRUBIN DIRECT: <0.2 MG/DL (ref 0–0.4)
BILIRUBIN, INDIRECT: NORMAL MG/DL (ref 0–0.6)
TOTAL PROTEIN: 7.1 G/DL (ref 6.3–8)

## 2019-05-06 ENCOUNTER — TELEPHONE (OUTPATIENT)
Dept: GASTROENTEROLOGY | Age: 29
End: 2019-05-06

## 2019-05-06 ENCOUNTER — OUTSIDE SERVICES (OUTPATIENT)
Dept: GASTROENTEROLOGY | Age: 29
End: 2019-05-06
Payer: MEDICAID

## 2019-05-06 DIAGNOSIS — K58.1 IRRITABLE BOWEL SYNDROME WITH CONSTIPATION: ICD-10-CM

## 2019-05-06 DIAGNOSIS — K62.5 RECTAL BLEEDING: Primary | ICD-10-CM

## 2019-05-06 DIAGNOSIS — K60.2 ANAL FISSURE: ICD-10-CM

## 2019-05-06 DIAGNOSIS — K59.04 CHRONIC IDIOPATHIC CONSTIPATION: ICD-10-CM

## 2019-05-06 PROCEDURE — 45378 DIAGNOSTIC COLONOSCOPY: CPT | Performed by: INTERNAL MEDICINE

## 2019-05-06 RX ORDER — DOCUSATE SODIUM 100 MG/1
100 CAPSULE, LIQUID FILLED ORAL NIGHTLY
Qty: 30 CAPSULE | Refills: 3 | Status: SHIPPED | OUTPATIENT
Start: 2019-05-06 | End: 2020-02-27

## 2019-05-06 NOTE — TELEPHONE ENCOUNTER
Left vm on phone regarding normal blood work results. I left our phone number on the machine as well in case he had any questions or concerns.

## 2019-05-09 ENCOUNTER — OFFICE VISIT (OUTPATIENT)
Dept: BEHAVIORAL/MENTAL HEALTH CLINIC | Age: 29
End: 2019-05-09
Payer: MEDICAID

## 2019-05-09 ENCOUNTER — TELEPHONE (OUTPATIENT)
Dept: GASTROENTEROLOGY | Age: 29
End: 2019-05-09

## 2019-05-09 ENCOUNTER — HOSPITAL ENCOUNTER (OUTPATIENT)
Dept: ULTRASOUND IMAGING | Age: 29
Discharge: HOME | End: 2019-05-11
Payer: MEDICAID

## 2019-05-09 DIAGNOSIS — R10.11 RUQ PAIN: ICD-10-CM

## 2019-05-09 DIAGNOSIS — F64.9 GENDER DYSPHORIA: ICD-10-CM

## 2019-05-09 DIAGNOSIS — F43.23 ADJUSTMENT DISORDER WITH MIXED ANXIETY AND DEPRESSED MOOD: Primary | ICD-10-CM

## 2019-05-09 PROCEDURE — 90832 PSYTX W PT 30 MINUTES: CPT | Performed by: PSYCHOLOGIST

## 2019-05-09 PROCEDURE — 76705 ECHO EXAM OF ABDOMEN: CPT

## 2019-05-09 PROCEDURE — 1036F TOBACCO NON-USER: CPT | Performed by: PSYCHOLOGIST

## 2019-05-09 ASSESSMENT — PATIENT HEALTH QUESTIONNAIRE - PHQ9
7. TROUBLE CONCENTRATING ON THINGS, SUCH AS READING THE NEWSPAPER OR WATCHING TELEVISION: 0
8. MOVING OR SPEAKING SO SLOWLY THAT OTHER PEOPLE COULD HAVE NOTICED. OR THE OPPOSITE, BEING SO FIGETY OR RESTLESS THAT YOU HAVE BEEN MOVING AROUND A LOT MORE THAN USUAL: 0
3. TROUBLE FALLING OR STAYING ASLEEP: 0
6. FEELING BAD ABOUT YOURSELF - OR THAT YOU ARE A FAILURE OR HAVE LET YOURSELF OR YOUR FAMILY DOWN: 0
10. IF YOU CHECKED OFF ANY PROBLEMS, HOW DIFFICULT HAVE THESE PROBLEMS MADE IT FOR YOU TO DO YOUR WORK, TAKE CARE OF THINGS AT HOME, OR GET ALONG WITH OTHER PEOPLE: 0
4. FEELING TIRED OR HAVING LITTLE ENERGY: 0
5. POOR APPETITE OR OVEREATING: 0
1. LITTLE INTEREST OR PLEASURE IN DOING THINGS: 0
9. THOUGHTS THAT YOU WOULD BE BETTER OFF DEAD, OR OF HURTING YOURSELF: 0
2. FEELING DOWN, DEPRESSED OR HOPELESS: 0
SUM OF ALL RESPONSES TO PHQ9 QUESTIONS 1 & 2: 0
SUM OF ALL RESPONSES TO PHQ QUESTIONS 1-9: 0
SUM OF ALL RESPONSES TO PHQ QUESTIONS 1-9: 0

## 2019-05-09 NOTE — PATIENT INSTRUCTIONS
[facility]  [address] [place] [address]  Marcelo Iglesias Psy.D      5/9/2019    RE: Roberto Carlos Brito III    To Gayle Moran,    The above named patient has requested documentation of verification of diagnosis. Pt has been diagnosed with Gender Dysphoria, with a disorder of sex development and an Adjustment Disorder with mixed anxiety and depressed mood. If you have any questions please contact me at the above number. Sincerely,        Ray Forman  Clinical Psychologist/Behavior Health Consultant  [facility]) Physicians
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
## 2019-05-23 ENCOUNTER — OFFICE VISIT (OUTPATIENT)
Dept: BEHAVIORAL/MENTAL HEALTH CLINIC | Age: 29
End: 2019-05-23
Payer: MEDICAID

## 2019-05-23 DIAGNOSIS — F64.0 GENDER DYSPHORIA IN ADULT: ICD-10-CM

## 2019-05-23 DIAGNOSIS — F43.23 ADJUSTMENT DISORDER WITH MIXED ANXIETY AND DEPRESSED MOOD: Primary | ICD-10-CM

## 2019-05-23 PROCEDURE — 90832 PSYTX W PT 30 MINUTES: CPT | Performed by: PSYCHOLOGIST

## 2019-05-23 PROCEDURE — 1036F TOBACCO NON-USER: CPT | Performed by: PSYCHOLOGIST

## 2019-05-23 ASSESSMENT — PATIENT HEALTH QUESTIONNAIRE - PHQ9
SUM OF ALL RESPONSES TO PHQ QUESTIONS 1-9: 0
2. FEELING DOWN, DEPRESSED OR HOPELESS: 0
8. MOVING OR SPEAKING SO SLOWLY THAT OTHER PEOPLE COULD HAVE NOTICED. OR THE OPPOSITE, BEING SO FIGETY OR RESTLESS THAT YOU HAVE BEEN MOVING AROUND A LOT MORE THAN USUAL: 0
SUM OF ALL RESPONSES TO PHQ QUESTIONS 1-9: 0
SUM OF ALL RESPONSES TO PHQ9 QUESTIONS 1 & 2: 0
9. THOUGHTS THAT YOU WOULD BE BETTER OFF DEAD, OR OF HURTING YOURSELF: 0
1. LITTLE INTEREST OR PLEASURE IN DOING THINGS: 0
10. IF YOU CHECKED OFF ANY PROBLEMS, HOW DIFFICULT HAVE THESE PROBLEMS MADE IT FOR YOU TO DO YOUR WORK, TAKE CARE OF THINGS AT HOME, OR GET ALONG WITH OTHER PEOPLE: 0
7. TROUBLE CONCENTRATING ON THINGS, SUCH AS READING THE NEWSPAPER OR WATCHING TELEVISION: 0
4. FEELING TIRED OR HAVING LITTLE ENERGY: 0
6. FEELING BAD ABOUT YOURSELF - OR THAT YOU ARE A FAILURE OR HAVE LET YOURSELF OR YOUR FAMILY DOWN: 0
3. TROUBLE FALLING OR STAYING ASLEEP: 0
5. POOR APPETITE OR OVEREATING: 0

## 2019-05-23 NOTE — PROGRESS NOTES
Behavioral Health Consultation  Norma Berrios PsyD. Psychologist  5/23/19  8:57 AM      Time spent with Patient: 20 minutes  This is patient's fifth  [facility] appointment. Reason for Consult:  stress and adjustment issues  Referring Provider: Petra Goldberg MD  [address] , [address] [facility]    Feedback given to PCP. S:  Pt reports stress in their relationship with their mother as well as at work. Pt feels that they are handling this frustration well. They report that they are trying to arrange hormone treatment with the [facility]. No SI/HI.         O:  MSE:    Appearance    alert, cooperative  Appetite normal  Sleep disturbance No  Fatigue No  Loss of pleasure No  Impulsive behavior No  Speech    spontaneous, normal rate and normal volume  Mood   neutral   Affect    normal affect  Thought Content    intact  Thought Process    linear, goal directed and coherent  Associations    logical connections  Insight    good  Judgment    good  Orientation    oriented to person, place, time, and general circumstances  Memory    recent and remote memory intact  Attention/Concentration    intact  Morbid ideation No  Suicide Assessment    no suicidal ideation      History:    Medications:   Current Outpatient Medications   Medication Sig Dispense Refill    docusate sodium (COLACE) 100 MG capsule Take 1 capsule by mouth nightly 30 capsule 3    polyethylene glycol (MIRALAX) powder Take 17 g by mouth daily 510 g 3    Na Sulfate-K Sulfate-Mg Sulf 17.5-3.13-1.6 GM/177ML SOLN As directed 1 Bottle 0    estrogens, conjugated, (PREMARIN) 0.3 MG tablet Take 1 tablet by mouth daily Once a day 30 tablet 3    Wheat Dextrin (BENEFIBER) POWD Take 15 g by mouth daily 1 Can 3    docusate sodium (COLACE) 100 MG capsule Take 2 capsules by mouth nightly 60 capsule 2    Rectal Protectant-Emollient (CALMOL-4) 76-10 % SUPP 1 to 2 times per day preferably after BM 24 suppository 3     No current facility-administered medications for this visit. Social History:   Social History     Socioeconomic History    Marital status: Single     Spouse name: Not on file    Number of children: Not on file    Years of education: Not on file    Highest education level: Not on file   Occupational History    Not on file   Social Needs    Financial resource strain: Not on file    Food insecurity:     Worry: Not on file     Inability: Not on file    Transportation needs:     Medical: Not on file     Non-medical: Not on file   Tobacco Use    Smoking status: Never Smoker    Smokeless tobacco: Never Used   Substance and Sexual Activity    Alcohol use: Yes    Drug use: No    Sexual activity: Not Currently     Partners: Female   Lifestyle    Physical activity:     Days per week: Not on file     Minutes per session: Not on file    Stress: Not on file   Relationships    Social connections:     Talks on phone: Not on file     Gets together: Not on file     Attends Jainism service: Not on file     Active member of club or organization: Not on file     Attends meetings of clubs or organizations: Not on file     Relationship status: Not on file    Intimate partner violence:     Fear of current or ex partner: Not on file     Emotionally abused: Not on file     Physically abused: Not on file     Forced sexual activity: Not on file   Other Topics Concern    Not on file   Social History Narrative    Not on file       Family History:   Family History   Problem Relation Age of Onset    Pancreatic Cancer Maternal Grandfather     Prostate Cancer Maternal Grandfather     Diabetes Maternal Grandfather     Diabetes Paternal Grandmother        TOBACCO:   reports that he has never smoked. He has never used smokeless tobacco.  ETOH:   reports that he drinks alcohol. A:  Administered the PHQ-9, scores indicate no symptoms of depression.   Pt reports that they are doing well and feel good about their decision to start hormone replacement therapy. At this time they prefer to come back as needed if symptoms increase/resume. PHQ Scores 5/23/2019 5/9/2019 4/18/2019 4/11/2019 4/4/2019 1/23/2019   PHQ2 Score 0 0 0 0 0 0   PHQ9 Score 0 0 0 2 5 0     Interpretation of Total Score Depression Severity: 1-4 = Minimal depression, 5-9 = Mild depression, 10-14 = Moderate depression, 15-19 = Moderately severe depression, 20-27 = Severe depression      Diagnosis:  Adjustment disorder with mixed anxiety and depressed mood  Gender Dysphoria, with a disorder of sex development    Plan:  Pt interventions:  [place]-setting to identify pt's primary goals for [facility] visit / overall health, Supportive techniques, Emphasized self-care as important for managing overall health and Problem-solving re: difficulties with the pt's mother and work as well as radical acceptence of things they cannot change. Pt Behavioral Change Plan:  Return as needed    Please note this report has been partially produced using speech recognition software and may cause contain errors related to that system including grammar, punctuation and spelling as well as words and phrases that may seem inappropriate. If there are questions or concerns please feel free to contact me to clarify.

## 2019-06-10 ENCOUNTER — TELEPHONE (OUTPATIENT)
Dept: GASTROENTEROLOGY | Age: 29
End: 2019-06-10

## 2019-06-10 NOTE — TELEPHONE ENCOUNTER
Patient called to get some results of tests done. He was to call the office today to talk to MA for those results. Please call and advise patient.

## 2019-06-27 ENCOUNTER — OFFICE VISIT (OUTPATIENT)
Dept: GASTROENTEROLOGY | Age: 29
End: 2019-06-27
Payer: MEDICAID

## 2019-06-27 VITALS
HEIGHT: 69 IN | RESPIRATION RATE: 16 BRPM | BODY MASS INDEX: 24.17 KG/M2 | WEIGHT: 163.2 LBS | HEART RATE: 75 BPM | OXYGEN SATURATION: 98 %

## 2019-06-27 DIAGNOSIS — K59.04 CHRONIC IDIOPATHIC CONSTIPATION: Primary | ICD-10-CM

## 2019-06-27 DIAGNOSIS — K60.2 ANAL FISSURE: ICD-10-CM

## 2019-06-27 PROCEDURE — 1036F TOBACCO NON-USER: CPT | Performed by: NURSE PRACTITIONER

## 2019-06-27 PROCEDURE — G8427 DOCREV CUR MEDS BY ELIG CLIN: HCPCS | Performed by: NURSE PRACTITIONER

## 2019-06-27 PROCEDURE — G8420 CALC BMI NORM PARAMETERS: HCPCS | Performed by: NURSE PRACTITIONER

## 2019-06-27 PROCEDURE — 99213 OFFICE O/P EST LOW 20 MIN: CPT | Performed by: NURSE PRACTITIONER

## 2019-06-27 NOTE — PROGRESS NOTES
Gastroenterology Clinic Follow up Visit    Alli Rojo  [id number]  Chief Complaint   Patient presents with    Follow-up     Background:29 y.o. male last seen in GI clinic on 5/1/19 with complaints of ongoing rectal bleeding. Patient seen in February, 2019 with rectal bleeding and pain. Rectal exam showed anal fissure. He was prescribed Nifedipine/lidocain ointment. Constipation. Patient scheduled for colonoscopy. Daily bowel regimen recommended. Interval change: patient presents to the GI clinic today to discuss colonoscopy results. Patient no longer experiencing rectal pain/bleeding. Patient has never used nifedipine/lidocain ointment. Patient continues to experience constipation. Has not tried daily bowel regimen. He reports he does not have time for this due to his work schedule. He denies melena or hematochezia. He denies weight loss or loss of appetite. He denies upper GI symptoms. He denies chest pain, shortness of breath, or palpitations. Review of Systems   All other systems reviewed and are negative. Past medical history, past surgical history, medication list, social and familyhistory reviewed    Pulse 75, resp. rate 16, height 5' 9\" (1.753 m), weight 163 lb 3.2 oz (74 kg), SpO2 98 %. Physical Exam   Constitutional: He is oriented to person, place, and time. He appears well-developed and well-nourished. No distress. HENT:   Head: Normocephalic and atraumatic. Eyes: Pupils are equal, round, and reactive to light. Conjunctivae and EOM are normal. No scleral icterus. Neck: Normal range of motion. Neck supple. Cardiovascular: Normal rate and regular rhythm. Pulmonary/Chest: Effort normal and breath sounds normal. No respiratory distress. He has no wheezes. He has no rales. He exhibits no tenderness. Abdominal: Soft. Bowel sounds are normal. He exhibits no distension and no mass. There is no tenderness. There is no rebound and no guarding.    Genitourinary:   Genitourinary Comments: deferred   Musculoskeletal: Normal range of motion. Lymphadenopathy:     He has no cervical adenopathy. Neurological: He is alert and oriented to person, place, and time. Skin: No rash noted. He is not diaphoretic. No erythema. No pallor. Psychiatric: He has a normal mood and affect. His behavior is normal. Judgment and thought content normal.   Vitals reviewed. Laboratory, Pathology, Radiology reviewed in detail with relevantimportant investigations summarized below:    No results for input(s): WBC, HGB, HCT, MCV, PLT in the last 720 hours. Lab Results   Component Value Date    ALT 13 05/02/2019    AST 15 05/02/2019    ALKPHOS 81 05/02/2019    BILITOT <0.2 05/02/2019     Endoscopic investigations: Colonoscopy 5/6/19- Small anal fissure, small external hemorrhoid otherwise normal colonic mucosa throughout. Assessment and Plan:  Roberto Carlos Brito III 30 y.o. male for follow up after undergoing endoscopic evaluation in May, 2019. Colonoscopy  report discussed with the patient. Patient no longer experiencing rectal pain or bleeding. However, patient has never used nifedipine/lidocaine ointment. Continues to experience constipation, has not tried daily bowel regimen. Patient encouraged to maintain daily bowel regimen to help with constipation. No alarming symptoms noted. Through explanation of daily bowel regimen provided again:  = Lifestyle modification including importance of adequate fluid intake through the day, regular exercise, good toilet hygiene discussed in detail  = Advised patient to increase fiber supplementation, aim for 15 -25 gms of fiber a day, OTC fiber supplementation may be considered if unable to meet requirements with diet, keep stools soft and regular, avoid straining. Patient advised to watch out for excessive bloating.  = Colace 1 to 2 tabs qhs  = ADD Miralax 17 gm/d to above regimen and titrate as needed. Return if symptoms worsen or fail to improve.     Jeanette Celis Jennifer, [address]    Please note this report has been partially produced using speech recognitionsoftware  and may cause contain errors related to that system including grammar, punctuation and spelling as well as words andphrases that may seem inappropriate. If there are questions or concerns please feel free to contact me to clarify.

## 2019-07-29 ENCOUNTER — APPOINTMENT (OUTPATIENT)
Dept: ULTRASOUND IMAGING | Age: 29
End: 2019-07-29
Payer: MEDICAID

## 2019-07-29 ENCOUNTER — HOSPITAL ENCOUNTER (EMERGENCY)
Age: 29
Discharge: HOME | End: 2019-07-29
Payer: MEDICAID

## 2019-07-29 VITALS
TEMPERATURE: 98.3 F | OXYGEN SATURATION: 99 % | SYSTOLIC BLOOD PRESSURE: 121 MMHG | HEART RATE: 77 BPM | HEIGHT: 69 IN | BODY MASS INDEX: 23.7 KG/M2 | WEIGHT: 160 LBS | DIASTOLIC BLOOD PRESSURE: 83 MMHG | RESPIRATION RATE: 16 BRPM

## 2019-07-29 DIAGNOSIS — T14.8XXA MUSCLE STRAIN: Primary | ICD-10-CM

## 2019-07-29 PROCEDURE — 99283 EMERGENCY DEPT VISIT LOW MDM: CPT

## 2019-07-29 PROCEDURE — 93971 EXTREMITY STUDY: CPT

## 2019-07-29 RX ORDER — NAPROXEN 500 MG/1
500 TABLET ORAL 2 TIMES DAILY
Qty: 20 TABLET | Refills: 0 | Status: SHIPPED | OUTPATIENT
Start: 2019-07-29 | End: 2020-02-27

## 2019-07-29 RX ORDER — NAPROXEN 500 MG/1
500 TABLET ORAL ONCE
Status: DISCONTINUED | OUTPATIENT
Start: 2019-07-29 | End: 2019-07-29 | Stop reason: HOSPADM

## 2019-07-29 ASSESSMENT — ENCOUNTER SYMPTOMS
PHOTOPHOBIA: 0
SORE THROAT: 0
VOMITING: 0
SHORTNESS OF BREATH: 0
RHINORRHEA: 0
COUGH: 0
EYE PAIN: 0
DIARRHEA: 0
ABDOMINAL PAIN: 0
BACK PAIN: 0
NAUSEA: 0

## 2019-07-29 ASSESSMENT — PAIN DESCRIPTION - FREQUENCY: FREQUENCY: INTERMITTENT

## 2019-07-29 ASSESSMENT — PAIN SCALES - GENERAL
PAINLEVEL_OUTOF10: 6
PAINLEVEL_OUTOF10: 6

## 2019-07-29 ASSESSMENT — PAIN DESCRIPTION - DESCRIPTORS: DESCRIPTORS: SHOOTING;BURNING

## 2019-07-29 ASSESSMENT — PAIN DESCRIPTION - PAIN TYPE: TYPE: ACUTE PAIN

## 2019-07-29 NOTE — ED PROVIDER NOTES
[facility] ED  EMERGENCY DEPARTMENT ENCOUNTER      Pt Name: Madison Conte  MRN: [id number]  [place] 1990  Date of evaluation: 7/29/2019  Provider: Izabella Zabala PA-C      HISTORY OF PRESENT ILLNESS    Eladia King III is a 30 y.o. male who presents to the Emergency Department with left posterior leg pain that began yesterday. \"feels like something is moving\". Walks 8 miles per day. Pt says he is at risk for DVT and was told to get it checked out. Risk because of hormone problems. Pain is better with standing. Denies redness swelling or warmth. REVIEW OF SYSTEMS       Review of Systems   Constitutional: Negative for chills, diaphoresis, fatigue and fever. HENT: Negative for congestion, rhinorrhea and sore throat. Eyes: Negative for photophobia and pain. Respiratory: Negative for cough and shortness of breath. Cardiovascular: Negative for chest pain and palpitations. Gastrointestinal: Negative for abdominal pain, diarrhea, nausea and vomiting. Genitourinary: Negative for dysuria and flank pain. Musculoskeletal: Positive for myalgias. Negative for back pain. Skin: Negative for rash. Neurological: Negative for dizziness, light-headedness and headaches. Psychiatric/Behavioral: Negative. All other systems reviewed and are negative.         PAST MEDICAL HISTORY     Past Medical History:   Diagnosis Date    Testicular atrophy          SURGICAL HISTORY       Past Surgical History:   Procedure Laterality Date    SINUS SURGERY  2016         CURRENT MEDICATIONS       Discharge Medication List as of 7/29/2019  7:43 PM      CONTINUE these medications which have NOT CHANGED    Details   !! docusate sodium (COLACE) 100 MG capsule Take 1 capsule by mouth nightly, Disp-30 capsule, R-3Normal      polyethylene glycol (MIRALAX) powder Take 17 g by mouth daily, Disp-510 g, R-3Normal      Na Sulfate-K Sulfate-Mg Sulf 17.5-3.13-1.6 GM/177ML SOLN As directed, Disp-1 Bottle, R-0Normal      Wheat Dextrin (BENEFIBER) POWD Take 15 g by mouth daily, Disp-1 Can, R-3Normal      !! docusate sodium (COLACE) 100 MG capsule Take 2 capsules by mouth nightly, Disp-60 capsule, R-2Normal      Rectal Protectant-Emollient (CALMOL-4) 76-10 % SUPP 1 to 2 times per day preferably after BM, Disp-24 suppository, R-3Normal       !! - Potential duplicate medications found. Please discuss with provider. ALLERGIES     Patient has no known allergies. FAMILY HISTORY       Family History   Problem Relation Age of Onset    Pancreatic Cancer Maternal Grandfather     Prostate Cancer Maternal Grandfather     Diabetes Maternal Grandfather     Diabetes Paternal Grandmother           SOCIAL HISTORY       Social History     Socioeconomic History    Marital status: Single     Spouse name: None    Number of children: None    Years of education: None    Highest education level: None   Occupational History    None   Social Needs    Financial resource strain: None    Food insecurity:     Worry: None     Inability: None    Transportation needs:     Medical: None     Non-medical: None   Tobacco Use    Smoking status: Never Smoker    Smokeless tobacco: Never Used   Substance and Sexual Activity    Alcohol use:  Yes    Drug use: No    Sexual activity: Not Currently     Partners: Female   Lifestyle    Physical activity:     Days per week: None     Minutes per session: None    Stress: None   Relationships    Social connections:     Talks on phone: None     Gets together: None     Attends Islam service: None     Active member of club or organization: None     Attends meetings of clubs or organizations: None     Relationship status: None    Intimate partner violence:     Fear of current or ex partner: None     Emotionally abused: None     Physically abused: None     Forced sexual activity: None   Other Topics Concern    None   Social History Narrative    None       SCREENINGS             PHYSICAL

## 2019-10-02 ENCOUNTER — OFFICE VISIT (OUTPATIENT)
Dept: FAMILY MEDICINE CLINIC | Age: 29
End: 2019-10-02
Payer: MEDICAID

## 2019-10-02 VITALS
WEIGHT: 156 LBS | HEIGHT: 67 IN | RESPIRATION RATE: 16 BRPM | TEMPERATURE: 98.4 F | BODY MASS INDEX: 24.48 KG/M2 | SYSTOLIC BLOOD PRESSURE: 108 MMHG | HEART RATE: 95 BPM | OXYGEN SATURATION: 98 % | DIASTOLIC BLOOD PRESSURE: 70 MMHG

## 2019-10-02 DIAGNOSIS — E29.1 HYPOGONADISM IN MALE: Primary | ICD-10-CM

## 2019-10-02 DIAGNOSIS — H91.93 BILATERAL HEARING LOSS, UNSPECIFIED HEARING LOSS TYPE: ICD-10-CM

## 2019-10-02 DIAGNOSIS — B07.9 VIRAL WARTS, UNSPECIFIED TYPE: ICD-10-CM

## 2019-10-02 PROCEDURE — 99214 OFFICE O/P EST MOD 30 MIN: CPT | Performed by: FAMILY MEDICINE

## 2019-10-02 PROCEDURE — G8484 FLU IMMUNIZE NO ADMIN: HCPCS | Performed by: FAMILY MEDICINE

## 2019-10-02 PROCEDURE — 1036F TOBACCO NON-USER: CPT | Performed by: FAMILY MEDICINE

## 2019-10-02 PROCEDURE — G8420 CALC BMI NORM PARAMETERS: HCPCS | Performed by: FAMILY MEDICINE

## 2019-10-02 PROCEDURE — G8427 DOCREV CUR MEDS BY ELIG CLIN: HCPCS | Performed by: FAMILY MEDICINE

## 2019-10-02 ASSESSMENT — ENCOUNTER SYMPTOMS
RESPIRATORY NEGATIVE: 1
GASTROINTESTINAL NEGATIVE: 1
EYES NEGATIVE: 1
SHORTNESS OF BREATH: 0
ALLERGIC/IMMUNOLOGIC NEGATIVE: 1

## 2020-01-09 ENCOUNTER — HOSPITAL ENCOUNTER (OUTPATIENT)
Dept: CT IMAGING | Age: 30
Discharge: HOME | End: 2020-01-11
Payer: MEDICAID

## 2020-01-09 VITALS — HEIGHT: 67 IN | WEIGHT: 144 LBS | BODY MASS INDEX: 22.6 KG/M2

## 2020-01-09 PROCEDURE — 70486 CT MAXILLOFACIAL W/O DYE: CPT

## 2020-02-27 ENCOUNTER — OFFICE VISIT (OUTPATIENT)
Dept: FAMILY MEDICINE CLINIC | Age: 30
End: 2020-02-27
Payer: MEDICAID

## 2020-02-27 VITALS
RESPIRATION RATE: 18 BRPM | HEIGHT: 67 IN | HEART RATE: 89 BPM | TEMPERATURE: 98.6 F | WEIGHT: 143 LBS | SYSTOLIC BLOOD PRESSURE: 90 MMHG | DIASTOLIC BLOOD PRESSURE: 56 MMHG | BODY MASS INDEX: 22.44 KG/M2 | OXYGEN SATURATION: 99 %

## 2020-02-27 PROCEDURE — 99213 OFFICE O/P EST LOW 20 MIN: CPT | Performed by: FAMILY MEDICINE

## 2020-02-27 PROCEDURE — 1036F TOBACCO NON-USER: CPT | Performed by: FAMILY MEDICINE

## 2020-02-27 PROCEDURE — G8484 FLU IMMUNIZE NO ADMIN: HCPCS | Performed by: FAMILY MEDICINE

## 2020-02-27 PROCEDURE — G8420 CALC BMI NORM PARAMETERS: HCPCS | Performed by: FAMILY MEDICINE

## 2020-02-27 PROCEDURE — G8427 DOCREV CUR MEDS BY ELIG CLIN: HCPCS | Performed by: FAMILY MEDICINE

## 2020-02-27 RX ORDER — SERTRALINE HYDROCHLORIDE 100 MG/1
TABLET, FILM COATED ORAL
COMMUNITY
Start: 2020-02-24

## 2020-02-27 RX ORDER — ESTRADIOL 1 MG/1
TABLET ORAL
COMMUNITY
Start: 2020-02-22 | End: 2020-04-18

## 2020-02-27 RX ORDER — SPIRONOLACTONE 50 MG/1
TABLET, FILM COATED ORAL
COMMUNITY
Start: 2020-02-22 | End: 2020-04-18

## 2020-02-27 ASSESSMENT — ENCOUNTER SYMPTOMS
GASTROINTESTINAL NEGATIVE: 1
ALLERGIC/IMMUNOLOGIC NEGATIVE: 1
EYES NEGATIVE: 1
RESPIRATORY NEGATIVE: 1
SHORTNESS OF BREATH: 0

## 2020-02-27 ASSESSMENT — PATIENT HEALTH QUESTIONNAIRE - PHQ9
SUM OF ALL RESPONSES TO PHQ QUESTIONS 1-9: 0
2. FEELING DOWN, DEPRESSED OR HOPELESS: 0
1. LITTLE INTEREST OR PLEASURE IN DOING THINGS: 0
SUM OF ALL RESPONSES TO PHQ9 QUESTIONS 1 & 2: 0
SUM OF ALL RESPONSES TO PHQ QUESTIONS 1-9: 0

## 2020-04-18 ENCOUNTER — OFFICE VISIT (OUTPATIENT)
Dept: PRIMARY CARE CLINIC | Age: 30
End: 2020-04-18
Payer: MEDICAID

## 2020-04-18 VITALS
RESPIRATION RATE: 15 BRPM | DIASTOLIC BLOOD PRESSURE: 70 MMHG | BODY MASS INDEX: 23.39 KG/M2 | HEIGHT: 67 IN | HEART RATE: 83 BPM | TEMPERATURE: 98.7 F | SYSTOLIC BLOOD PRESSURE: 110 MMHG | WEIGHT: 149 LBS | OXYGEN SATURATION: 83 %

## 2020-04-18 DIAGNOSIS — R68.89 FLU-LIKE SYMPTOMS: ICD-10-CM

## 2020-04-18 LAB
INFLUENZA A ANTIBODY: NEGATIVE
INFLUENZA B ANTIBODY: NEGATIVE
S PYO AG THROAT QL: NORMAL

## 2020-04-18 PROCEDURE — 99214 OFFICE O/P EST MOD 30 MIN: CPT | Performed by: NURSE PRACTITIONER

## 2020-04-18 PROCEDURE — G8420 CALC BMI NORM PARAMETERS: HCPCS | Performed by: NURSE PRACTITIONER

## 2020-04-18 PROCEDURE — 87880 STREP A ASSAY W/OPTIC: CPT | Performed by: NURSE PRACTITIONER

## 2020-04-18 PROCEDURE — 87804 INFLUENZA ASSAY W/OPTIC: CPT | Performed by: NURSE PRACTITIONER

## 2020-04-18 PROCEDURE — 1036F TOBACCO NON-USER: CPT | Performed by: NURSE PRACTITIONER

## 2020-04-18 PROCEDURE — G8427 DOCREV CUR MEDS BY ELIG CLIN: HCPCS | Performed by: NURSE PRACTITIONER

## 2020-04-18 RX ORDER — AMOXICILLIN AND CLAVULANATE POTASSIUM 875; 125 MG/1; MG/1
1 TABLET, FILM COATED ORAL 2 TIMES DAILY
Qty: 20 TABLET | Refills: 0 | Status: SHIPPED | OUTPATIENT
Start: 2020-04-18 | End: 2020-04-28

## 2020-04-18 RX ORDER — SPIRONOLACTONE 100 MG/1
100 TABLET, FILM COATED ORAL 2 TIMES DAILY
COMMUNITY
Start: 2020-03-17

## 2020-04-18 RX ORDER — LIDOCAINE 5% 5 G/100G
CREAM TOPICAL
COMMUNITY
Start: 2020-03-06

## 2020-04-18 RX ORDER — FLUTICASONE PROPIONATE 50 MCG
1 SPRAY, SUSPENSION (ML) NASAL DAILY
COMMUNITY
Start: 2020-03-17

## 2020-04-18 RX ORDER — ESTRADIOL 2 MG/1
2 TABLET ORAL 2 TIMES DAILY
COMMUNITY
Start: 2020-03-17 | End: 2021-04-16

## 2020-04-18 ASSESSMENT — VISUAL ACUITY: OU: 1

## 2020-04-18 NOTE — PROGRESS NOTES
Conjunctivae normal.      Pupils: Pupils are equal, round, and reactive to light. Cardiovascular:      Rate and Rhythm: Normal rate and regular rhythm. Heart sounds: Normal heart sounds. Pulmonary:      Effort: Pulmonary effort is normal.      Breath sounds: Normal breath sounds and air entry. Lymphadenopathy:      Head:      Right side of head: Submandibular and tonsillar adenopathy present. Left side of head: Submandibular and tonsillar adenopathy present. Cervical: Cervical adenopathy present. Skin:     General: Skin is warm and dry. Findings: Rash present. Comments: Tops of hands are dry and erythematous   Neurological:      Mental Status: She is alert. Assessment/Plan:  1. Flu-like symptoms  - POCT rapid strep A  - POCT Influenza A/B  - COVID-19 Ambulatory; Future  -Symptomatic treatment discussed  -Self quarantine advised x 14 days    2. Acute bacterial sinusitis  - amoxicillin-clavulanate (AUGMENTIN) 875-125 MG per tablet; Take 1 tablet by mouth 2 times daily for 10 days  Dispense: 20 tablet; Refill: 0    Side effects, adverse effects of the medication prescribed today, as well as treatment plan/ rationale and result expectations have been discussed with the patient who expresses understanding and desires to proceed. Close follow up to evaluate treatment results and for coordination of care. I have reviewed the patient's medical history in detail and updated the computerized patient record. As always, patient is advised that if symptoms worsen in any way they will proceed to the nearest emergency room. LUCY Ulloa - CNP  4/18/20     This visit was provided as a focused evaluation during the COVID -19 pandemic/national emergency. A comprehensive review of all previous patient history and testing was not conducted. Pertinent findings were elicited during the visit.

## 2020-04-18 NOTE — PATIENT INSTRUCTIONS
Advance Care Planning  People with COVID-19 may have no symptoms, mild symptoms, such as fever, cough, and shortness of breath or they may have more severe illness, developing severe and fatal pneumonia. As a result, Advance Care Planning with attention to naming a health care decision maker (someone you trust to make healthcare decisions for you if you could not speak for yourself) and sharing other health care preferences is important BEFORE a possible health crisis. Please contact your Primary Care Provider to discuss Advance Care Planning. Preventing the Spread of Coronavirus Disease 2019 in Homes and Residential Communities  For the most recent information go to [email or web address]    Prevention steps for People with confirmed or suspected COVID-19 (including persons under investigation) who do not need to be hospitalized  and   People with confirmed COVID-19 who were hospitalized and determined to be medically stable to go home    Your healthcare provider and public health staff will evaluate whether you can be cared for at home. If it is determined that you do not need to be hospitalized and can be isolated at home, you will be monitored by staff from your local or state health department. You should follow the prevention steps below until a healthcare provider or local or state health department says you can return to your normal activities. Stay home except to get medical care  People who are mildly ill with COVID-19 are able to isolate at home during their illness. You should restrict activities outside your home, except for getting medical care. Do not go to work, school, or public areas. Avoid using public transportation, ride-sharing, or taxis. Separate yourself from other people and animals in your home  People: As much as possible, you should stay in a specific room and away from other people in your home.  Also, you should use a separate before eating or preparing food. If soap and water are not readily available, use an alcohol-based hand  with at least 60% alcohol, covering all surfaces of your hands and rubbing them together until they feel dry. Soap and water are the best option if hands are visibly dirty. Avoid touching your eyes, nose, and mouth with unwashed hands. Avoid sharing personal household items  You should not share dishes, drinking glasses, cups, eating utensils, towels, or bedding with other people or pets in your home. After using these items, they should be washed thoroughly with soap and water. Clean all high-touch surfaces everyday  High touch surfaces include counters, tabletops, doorknobs, bathroom fixtures, toilets, phones, keyboards, tablets, and bedside tables. Also, clean any surfaces that may have blood, stool, or body fluids on them. Use a household cleaning spray or wipe, according to the label instructions. Labels contain instructions for safe and effective use of the cleaning product including precautions you should take when applying the product, such as wearing gloves and making sure you have good ventilation during use of the product. Monitor your symptoms  Seek prompt medical attention if your illness is worsening (e.g., difficulty breathing). Before seeking care, call your healthcare provider and tell them that you have, or are being evaluated for, COVID-19. Put on a facemask before you enter the facility. These steps will help the healthcare providers office to keep other people in the office or waiting room from getting infected or exposed. Ask your healthcare provider to call the local or state health department. Persons who are placed under active monitoring or facilitated self-monitoring should follow instructions provided by their local health department or occupational health professionals, as appropriate. When working with your local health department check their available hours.   If you

## 2020-04-21 LAB
SARS-COV-2: NOT DETECTED
SOURCE: NORMAL

## 2020-05-11 ENCOUNTER — TELEPHONE (OUTPATIENT)
Dept: ADMINISTRATIVE | Age: 30
End: 2020-05-11

## 2020-05-11 NOTE — TELEPHONE ENCOUNTER
Patient called, wants treatment on warts on both his hands, they are spreading,  Was tested for covid19 4-18-20, Tonsile infection, has fatigue, cough and some swelling in throat,  Difficulty breathing,  Low tempature 83-88. Patient is concerned about possible getting a 's note for off work.   Can we schedule a face to face for treatment of hands or should we do a Virtual Vist?

## 2020-05-13 ENCOUNTER — HOSPITAL ENCOUNTER (EMERGENCY)
Age: 30
Discharge: HOME | End: 2020-05-13
Payer: MEDICAID

## 2020-05-13 ENCOUNTER — APPOINTMENT (OUTPATIENT)
Dept: CT IMAGING | Age: 30
End: 2020-05-13
Payer: MEDICAID

## 2020-05-13 ENCOUNTER — APPOINTMENT (OUTPATIENT)
Dept: GENERAL RADIOLOGY | Age: 30
End: 2020-05-13
Payer: MEDICAID

## 2020-05-13 VITALS
HEIGHT: 67 IN | TEMPERATURE: 98 F | BODY MASS INDEX: 23.54 KG/M2 | WEIGHT: 150 LBS | SYSTOLIC BLOOD PRESSURE: 114 MMHG | DIASTOLIC BLOOD PRESSURE: 66 MMHG | RESPIRATION RATE: 18 BRPM | HEART RATE: 80 BPM | OXYGEN SATURATION: 98 %

## 2020-05-13 LAB
ALBUMIN SERPL-MCNC: 4.4 G/DL (ref 3.5–4.6)
ALP BLD-CCNC: 54 U/L (ref 35–104)
ALT SERPL-CCNC: 8 U/L (ref 0–41)
ANION GAP SERPL CALCULATED.3IONS-SCNC: 11 MEQ/L (ref 9–15)
APTT: 30.9 SEC (ref 24.4–36.8)
AST SERPL-CCNC: 16 U/L (ref 0–40)
BASOPHILS ABSOLUTE: 0 K/UL (ref 0–0.2)
BASOPHILS RELATIVE PERCENT: 0.5 %
BILIRUB SERPL-MCNC: 0.3 MG/DL (ref 0.2–0.7)
BUN BLDV-MCNC: 14 MG/DL (ref 6–20)
CALCIUM SERPL-MCNC: 8.8 MG/DL (ref 8.5–9.9)
CHLORIDE BLD-SCNC: 97 MEQ/L (ref 95–107)
CO2: 27 MEQ/L (ref 20–31)
CREAT SERPL-MCNC: 0.78 MG/DL (ref 0.7–1.2)
EKG ATRIAL RATE: 80 BPM
EKG P AXIS: 71 DEGREES
EKG P-R INTERVAL: 134 MS
EKG Q-T INTERVAL: 398 MS
EKG QRS DURATION: 90 MS
EKG QTC CALCULATION (BAZETT): 459 MS
EKG R AXIS: 76 DEGREES
EKG T AXIS: 56 DEGREES
EKG VENTRICULAR RATE: 80 BPM
EOSINOPHILS ABSOLUTE: 0.2 K/UL (ref 0–0.7)
EOSINOPHILS RELATIVE PERCENT: 1.7 %
ETHANOL PERCENT: NORMAL G/DL
ETHANOL: <10 MG/DL (ref 0–0.08)
GFR AFRICAN AMERICAN: >60
GFR NON-AFRICAN AMERICAN: >60
GLOBULIN: 2.6 G/DL (ref 2.3–3.5)
GLUCOSE BLD-MCNC: 98 MG/DL (ref 70–99)
HCT VFR BLD CALC: 40.2 % (ref 42–52)
HEMOGLOBIN: 14.1 G/DL (ref 14–18)
INR BLD: 1
LYMPHOCYTES ABSOLUTE: 2.3 K/UL (ref 1–4.8)
LYMPHOCYTES RELATIVE PERCENT: 24.8 %
MCH RBC QN AUTO: 32.6 PG (ref 27–31.3)
MCHC RBC AUTO-ENTMCNC: 35 % (ref 33–37)
MCV RBC AUTO: 93.3 FL (ref 80–100)
MONOCYTES ABSOLUTE: 0.9 K/UL (ref 0.2–0.8)
MONOCYTES RELATIVE PERCENT: 10.1 %
NEUTROPHILS ABSOLUTE: 5.7 K/UL (ref 1.4–6.5)
NEUTROPHILS RELATIVE PERCENT: 62.9 %
PDW BLD-RTO: 12.6 % (ref 11.5–14.5)
PLATELET # BLD: 207 K/UL (ref 130–400)
POC CREATININE WHOLE BLOOD: 0.8
POTASSIUM SERPL-SCNC: 3.7 MEQ/L (ref 3.4–4.9)
PROTHROMBIN TIME: 13.1 SEC (ref 12.3–14.9)
RBC # BLD: 4.31 M/UL (ref 4.7–6.1)
SODIUM BLD-SCNC: 135 MEQ/L (ref 135–144)
TOTAL PROTEIN: 7 G/DL (ref 6.3–8)
WBC # BLD: 9.1 K/UL (ref 4.8–10.8)

## 2020-05-13 PROCEDURE — 85730 THROMBOPLASTIN TIME PARTIAL: CPT

## 2020-05-13 PROCEDURE — 6360000004 HC RX CONTRAST MEDICATION: Performed by: PHYSICIAN ASSISTANT

## 2020-05-13 PROCEDURE — 72131 CT LUMBAR SPINE W/O DYE: CPT

## 2020-05-13 PROCEDURE — 36415 COLL VENOUS BLD VENIPUNCTURE: CPT

## 2020-05-13 PROCEDURE — 80053 COMPREHEN METABOLIC PANEL: CPT

## 2020-05-13 PROCEDURE — 74177 CT ABD & PELVIS W/CONTRAST: CPT

## 2020-05-13 PROCEDURE — 71260 CT THORAX DX C+: CPT

## 2020-05-13 PROCEDURE — 85025 COMPLETE CBC W/AUTO DIFF WBC: CPT

## 2020-05-13 PROCEDURE — G0480 DRUG TEST DEF 1-7 CLASSES: HCPCS

## 2020-05-13 PROCEDURE — 85610 PROTHROMBIN TIME: CPT

## 2020-05-13 PROCEDURE — 6370000000 HC RX 637 (ALT 250 FOR IP): Performed by: PHYSICIAN ASSISTANT

## 2020-05-13 PROCEDURE — 99284 EMERGENCY DEPT VISIT MOD MDM: CPT

## 2020-05-13 PROCEDURE — 73080 X-RAY EXAM OF ELBOW: CPT

## 2020-05-13 PROCEDURE — 73564 X-RAY EXAM KNEE 4 OR MORE: CPT

## 2020-05-13 PROCEDURE — 72125 CT NECK SPINE W/O DYE: CPT

## 2020-05-13 PROCEDURE — 93005 ELECTROCARDIOGRAM TRACING: CPT | Performed by: PHYSICIAN ASSISTANT

## 2020-05-13 PROCEDURE — 73090 X-RAY EXAM OF FOREARM: CPT

## 2020-05-13 PROCEDURE — 73552 X-RAY EXAM OF FEMUR 2/>: CPT

## 2020-05-13 PROCEDURE — 70450 CT HEAD/BRAIN W/O DYE: CPT

## 2020-05-13 RX ORDER — IBUPROFEN 600 MG/1
600 TABLET ORAL EVERY 6 HOURS PRN
Qty: 120 TABLET | Refills: 0 | Status: SHIPPED | OUTPATIENT
Start: 2020-05-13

## 2020-05-13 RX ORDER — BACITRACIN, NEOMYCIN, POLYMYXIN B 400; 3.5; 5 [USP'U]/G; MG/G; [USP'U]/G
OINTMENT TOPICAL ONCE
Status: COMPLETED | OUTPATIENT
Start: 2020-05-13 | End: 2020-05-13

## 2020-05-13 RX ORDER — CYCLOBENZAPRINE HCL 10 MG
10 TABLET ORAL 3 TIMES DAILY PRN
Qty: 15 TABLET | Refills: 0 | Status: SHIPPED | OUTPATIENT
Start: 2020-05-13 | End: 2020-05-18

## 2020-05-13 RX ADMIN — IOPAMIDOL 100 ML: 612 INJECTION, SOLUTION INTRAVENOUS at 20:38

## 2020-05-13 RX ADMIN — BACITRACIN ZINC, NEOMYCIN SULFATE, POLYMYXIN B SULFATE: 3.5; 5000; 4 OINTMENT TOPICAL at 21:34

## 2020-05-13 ASSESSMENT — ENCOUNTER SYMPTOMS
ANAL BLEEDING: 0
EYE DISCHARGE: 0
BACK PAIN: 1
COUGH: 0
SHORTNESS OF BREATH: 0
NAUSEA: 0
APNEA: 0
PHOTOPHOBIA: 0
VOICE CHANGE: 0
VOMITING: 0
ABDOMINAL PAIN: 0
EYE PAIN: 0
ABDOMINAL DISTENTION: 0

## 2020-05-13 ASSESSMENT — PAIN SCALES - GENERAL: PAINLEVEL_OUTOF10: 9

## 2020-05-13 ASSESSMENT — PAIN DESCRIPTION - LOCATION: LOCATION: ARM;HIP;KNEE

## 2020-05-13 ASSESSMENT — PAIN DESCRIPTION - ORIENTATION: ORIENTATION: RIGHT
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
## 2020-05-13 NOTE — ED PROVIDER NOTES
Socioeconomic History    Marital status: Single     Spouse name: None    Number of children: None    Years of education: None    Highest education level: None   Occupational History    None   Social Needs    Financial resource strain: None    Food insecurity     Worry: None     Inability: None    Transportation needs     Medical: None     Non-medical: None   Tobacco Use    Smoking status: Never Smoker    Smokeless tobacco: Never Used   Substance and Sexual Activity    Alcohol use: Yes    Drug use: No    Sexual activity: Not Currently     Partners: Female   Lifestyle    Physical activity     Days per week: None     Minutes per session: None    Stress: None   Relationships    Social connections     Talks on phone: None     Gets together: None     Attends Synagogue service: None     Active member of club or organization: None     Attends meetings of clubs or organizations: None     Relationship status: None    Intimate partner violence     Fear of current or ex partner: None     Emotionally abused: None     Physically abused: None     Forced sexual activity: None   Other Topics Concern    None   Social History Narrative    None       SCREENINGS    [place] Coma Scale  Eye Opening: Spontaneous  Best Verbal Response: Oriented  Best Motor Response: Obeys commands  [place] Coma Scale Score: 15 @FLOW([id number])@      PHYSICAL EXAM    (up to 7 for level 4, 8 or more for level 5)     ED Triage Vitals   BP Temp Temp Source Pulse Resp SpO2 Height Weight   05/13/20 1757 05/13/20 1757 05/13/20 1757 05/13/20 1757 05/13/20 1757 05/13/20 1757 05/13/20 1758 05/13/20 1758   117/71 98 °F (36.7 °C) Oral 88 19 97 % 5' 7\" (1.702 m) 150 lb (68 kg)       Physical Exam  Vitals signs and nursing note reviewed. Constitutional:       General: She is not in acute distress. Appearance: She is well-developed. HENT:      Head: Normocephalic and atraumatic.       Right Ear: Tympanic membrane and external ear normal.

## 2020-05-14 ENCOUNTER — TELEMEDICINE (OUTPATIENT)
Dept: FAMILY MEDICINE CLINIC | Age: 30
End: 2020-05-14
Payer: MEDICAID

## 2020-05-14 LAB
GFR AFRICAN AMERICAN: >60
GFR NON-AFRICAN AMERICAN: >60
PERFORMED ON: ABNORMAL
POC CREATININE: 0.8 MG/DL (ref 0.9–1.3)
POC SAMPLE TYPE: ABNORMAL

## 2020-05-14 PROCEDURE — 99213 OFFICE O/P EST LOW 20 MIN: CPT | Performed by: FAMILY MEDICINE

## 2020-05-14 PROCEDURE — G8427 DOCREV CUR MEDS BY ELIG CLIN: HCPCS | Performed by: FAMILY MEDICINE

## 2020-05-14 PROCEDURE — 93010 ELECTROCARDIOGRAM REPORT: CPT | Performed by: INTERNAL MEDICINE

## 2020-05-14 RX ORDER — IMIQUIMOD 12.5 MG/.25G
CREAM TOPICAL
Qty: 1 BOX | Refills: 1 | Status: SHIPPED | OUTPATIENT
Start: 2020-05-14 | End: 2020-05-21

## 2020-05-14 ASSESSMENT — ENCOUNTER SYMPTOMS
ALLERGIC/IMMUNOLOGIC NEGATIVE: 1
EYES NEGATIVE: 1
RESPIRATORY NEGATIVE: 1
BACK PAIN: 1
GASTROINTESTINAL NEGATIVE: 1
SHORTNESS OF BREATH: 0

## 2020-05-14 NOTE — LETTER
[facility]  [address] [id number]  Phone: [phone]  Fax: [phone]    Edwina Jaquez MD        May 14, 2020     Patient: Win Mistry III   YOB: 1990   Date of Visit: 5/14/2020       To Whom It May Concern: It is my medical opinion that Frannie Hughes should remain out of work until throat and respitory symptoms have improved. He is being evaluated by ENT. .    If you have any questions or concerns, please don't hesitate to call.     Sincerely,        Edwina Jaquez MD

## 2020-05-14 NOTE — PROGRESS NOTES
 Alcohol use: Yes    Drug use: No    Sexual activity: Not Currently     Partners: Female   Lifestyle    Physical activity     Days per week: Not on file     Minutes per session: Not on file    Stress: Not on file   Relationships    Social connections     Talks on phone: Not on file     Gets together: Not on file     Attends Uats[employer] service: Not on file     Active member of club or organization: Not on file     Attends meetings of clubs or organizations: Not on file     Relationship status: Not on file    Intimate partner violence     Fear of current or ex partner: Not on file     Emotionally abused: Not on file     Physically abused: Not on file     Forced sexual activity: Not on file   Other Topics Concern    Not on file   Social History Narrative    Not on file     Current Outpatient Medications   Medication Sig Dispense Refill    ibuprofen (IBU) 600 MG tablet Take 1 tablet by mouth every 6 hours as needed for Pain 120 tablet 0    cyclobenzaprine (FLEXERIL) 10 MG tablet Take 1 tablet by mouth 3 times daily as needed for Muscle spasms 15 tablet 0    fluticasone (FLONASE) 50 MCG/ACT nasal spray 1 spray by NOT APPLICABLE route daily      Lidocaine 5 % CREA APPLY TO AFFECTED AREA AS NEEDED AS DIRECTED      estradiol (ESTRACE) 2 MG tablet Take 2 mg by mouth 2 times daily      spironolactone (ALDACTONE) 100 MG tablet Take 100 mg by mouth 2 times daily      sertraline (ZOLOFT) 100 MG tablet        No current facility-administered medications for this visit.       Current Outpatient Medications on File Prior to Visit   Medication Sig Dispense Refill    ibuprofen (IBU) 600 MG tablet Take 1 tablet by mouth every 6 hours as needed for Pain 120 tablet 0    cyclobenzaprine (FLEXERIL) 10 MG tablet Take 1 tablet by mouth 3 times daily as needed for Muscle spasms 15 tablet 0    fluticasone (FLONASE) 50 MCG/ACT nasal spray 1 spray by NOT APPLICABLE route daily      Lidocaine 5 % CREA APPLY TO AFFECTED AREA

## 2020-05-20 ENCOUNTER — TELEMEDICINE (OUTPATIENT)
Dept: FAMILY MEDICINE CLINIC | Age: 30
End: 2020-05-20
Payer: MEDICAID

## 2020-05-20 PROCEDURE — 99214 OFFICE O/P EST MOD 30 MIN: CPT | Performed by: NURSE PRACTITIONER

## 2020-05-20 PROCEDURE — G8428 CUR MEDS NOT DOCUMENT: HCPCS | Performed by: NURSE PRACTITIONER

## 2020-05-20 ASSESSMENT — ENCOUNTER SYMPTOMS
BACK PAIN: 1
GASTROINTESTINAL NEGATIVE: 1
RESPIRATORY NEGATIVE: 1
EYES NEGATIVE: 1
BOWEL INCONTINENCE: 0
SHORTNESS OF BREATH: 0
ABDOMINAL PAIN: 0
ALLERGIC/IMMUNOLOGIC NEGATIVE: 1

## 2020-06-09 ENCOUNTER — TELEMEDICINE (OUTPATIENT)
Dept: FAMILY MEDICINE CLINIC | Age: 30
End: 2020-06-09
Payer: MEDICAID

## 2020-06-09 PROCEDURE — 99213 OFFICE O/P EST LOW 20 MIN: CPT | Performed by: NURSE PRACTITIONER

## 2020-06-09 PROCEDURE — G8428 CUR MEDS NOT DOCUMENT: HCPCS | Performed by: NURSE PRACTITIONER

## 2020-06-09 RX ORDER — SALICYLIC ACID 17 %
LIQUID (ML) TOPICAL
Qty: 1 BOTTLE | Refills: 2 | Status: SHIPPED | OUTPATIENT
Start: 2020-06-09
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
## 2020-06-17 ASSESSMENT — ENCOUNTER SYMPTOMS
ALLERGIC/IMMUNOLOGIC NEGATIVE: 1
RESPIRATORY NEGATIVE: 1
ABDOMINAL PAIN: 0
EYES NEGATIVE: 1
BACK PAIN: 1
SHORTNESS OF BREATH: 0
GASTROINTESTINAL NEGATIVE: 1

## 2020-07-11 ENCOUNTER — PATIENT MESSAGE (OUTPATIENT)
Dept: FAMILY MEDICINE CLINIC | Age: 30
End: 2020-07-11

## 2020-07-15 ENCOUNTER — OFFICE VISIT (OUTPATIENT)
Dept: NEUROLOGY | Age: 30
End: 2020-07-15
Payer: MEDICAID

## 2020-07-15 VITALS
DIASTOLIC BLOOD PRESSURE: 57 MMHG | SYSTOLIC BLOOD PRESSURE: 104 MMHG | WEIGHT: 146 LBS | BODY MASS INDEX: 22.87 KG/M2 | HEART RATE: 79 BPM

## 2020-07-15 PROBLEM — S09.90XA CLOSED HEAD INJURY: Status: ACTIVE | Noted: 2020-07-15

## 2020-07-15 PROBLEM — S06.0X1A CONCUSSION WTH LOSS OF CONSCIOUSNESS OF 30 MINUTES OR LESS: Status: ACTIVE | Noted: 2020-07-15

## 2020-07-15 PROCEDURE — G8427 DOCREV CUR MEDS BY ELIG CLIN: HCPCS | Performed by: PSYCHIATRY & NEUROLOGY

## 2020-07-15 PROCEDURE — 1036F TOBACCO NON-USER: CPT | Performed by: PSYCHIATRY & NEUROLOGY

## 2020-07-15 PROCEDURE — G8420 CALC BMI NORM PARAMETERS: HCPCS | Performed by: PSYCHIATRY & NEUROLOGY

## 2020-07-15 PROCEDURE — 99204 OFFICE O/P NEW MOD 45 MIN: CPT | Performed by: PSYCHIATRY & NEUROLOGY

## 2020-07-15 RX ORDER — OMEPRAZOLE 20 MG/1
CAPSULE, DELAYED RELEASE ORAL
COMMUNITY
Start: 2020-06-09

## 2020-07-15 RX ORDER — LIDOCAINE 5 G/100G
CREAM RECTAL; TOPICAL
COMMUNITY
Start: 2020-03-06

## 2020-07-15 ASSESSMENT — ENCOUNTER SYMPTOMS
BACK PAIN: 0
PHOTOPHOBIA: 0
SHORTNESS OF BREATH: 0
VOMITING: 0
NAUSEA: 0
COLOR CHANGE: 0
TROUBLE SWALLOWING: 0
CHOKING: 0
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
## 2021-04-16 ENCOUNTER — HOSPITAL ENCOUNTER (EMERGENCY)
Age: 31
Discharge: HOME | End: 2021-04-16
Payer: MEDICAID

## 2021-04-16 VITALS
BODY MASS INDEX: 23.54 KG/M2 | WEIGHT: 150 LBS | RESPIRATION RATE: 18 BRPM | SYSTOLIC BLOOD PRESSURE: 106 MMHG | TEMPERATURE: 98.1 F | HEART RATE: 86 BPM | HEIGHT: 67 IN | OXYGEN SATURATION: 97 % | DIASTOLIC BLOOD PRESSURE: 67 MMHG

## 2021-04-16 DIAGNOSIS — M27.8 JAW MASS: Primary | ICD-10-CM

## 2021-04-16 PROCEDURE — 99283 EMERGENCY DEPT VISIT LOW MDM: CPT

## 2021-04-16 RX ORDER — AZELASTINE 1 MG/ML
1 SPRAY, METERED NASAL 2 TIMES DAILY
COMMUNITY

## 2021-04-16 RX ORDER — PROGESTERONE 100 MG/1
100 CAPSULE ORAL DAILY
COMMUNITY

## 2021-04-16 RX ORDER — DOCUSATE SODIUM 100 MG/1
100 CAPSULE, LIQUID FILLED ORAL 2 TIMES DAILY
COMMUNITY

## 2021-04-16 ASSESSMENT — ENCOUNTER SYMPTOMS
SHORTNESS OF BREATH: 0
TROUBLE SWALLOWING: 0
BACK PAIN: 0
COUGH: 0
ABDOMINAL PAIN: 0
SORE THROAT: 0

## 2021-04-16 ASSESSMENT — PAIN DESCRIPTION - LOCATION: LOCATION: JAW

## 2021-04-16 ASSESSMENT — PAIN SCALES - GENERAL: PAINLEVEL_OUTOF10: 7
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
## 2021-04-16 NOTE — ED PROVIDER NOTES
TABLET    Take 1 tablet by mouth every 6 hours as needed for Pain    LIDOCAINE 5 % CREA    APPLY TO AFFECTED AREA AS NEEDED AS DIRECTED    LIDOCAINE, ANORECTAL, 5 % CREA    APPLY TO AFFECTED AREA AS NEEDED AS DIRECTED    OMEPRAZOLE (PRILOSEC) 20 MG DELAYED RELEASE CAPSULE        PROGESTERONE (PROMETRIUM) 100 MG CAPS CAPSULE    Take 100 mg by mouth daily    SALICYLIC ACID-LACTIC ACID (COMPOUND W) 17 % EXTERNAL SOLUTION    Apply topically. SERTRALINE (ZOLOFT) 100 MG TABLET        SPIRONOLACTONE (ALDACTONE) 100 MG TABLET    Take 100 mg by mouth 2 times daily       ALLERGIES     Patient has no known allergies. FAMILY HISTORY       Family History   Problem Relation Age of Onset    Pancreatic Cancer Maternal Grandfather     Prostate Cancer Maternal Grandfather     Diabetes Maternal Grandfather     Diabetes Paternal Grandmother           SOCIAL HISTORY       Social History     Socioeconomic History    Marital status: Single     Spouse name: None    Number of children: None    Years of education: None    Highest education level: None   Occupational History    None   Social Needs    Financial resource strain: None    Food insecurity     Worry: None     Inability: None    Transportation needs     Medical: None     Non-medical: None   Tobacco Use    Smoking status: Never Smoker    Smokeless tobacco: Never Used   Substance and Sexual Activity    Alcohol use:  Yes    Drug use: No    Sexual activity: Not Currently     Partners: Female   Lifestyle    Physical activity     Days per week: None     Minutes per session: None    Stress: None   Relationships    Social connections     Talks on phone: None     Gets together: None     Attends Mandaen service: None     Active member of club or organization: None     Attends meetings of clubs or organizations: None     Relationship status: None    Intimate partner violence     Fear of current or ex partner: None     Emotionally abused: None     Physically abused: None     Forced sexual activity: None   Other Topics Concern    None   Social History Narrative    None       SCREENINGS      @FLOW([id number])@      PHYSICAL EXAM    (up to 7 for level 4, 8 or more for level 5)     ED Triage Vitals [04/16/21 1004]   BP Temp Temp Source Pulse Resp SpO2 Height Weight   106/67 98.1 °F (36.7 °C) Oral 86 18 97 % 5' 7\" (1.702 m) 150 lb (68 kg)       Physical Exam  Vitals signs and nursing note reviewed. Constitutional:       Appearance: She is well-developed. HENT:      Head: Normocephalic and atraumatic. Jaw: Tenderness present. No swelling. Right Ear: Hearing and external ear normal.      Left Ear: Hearing and external ear normal.      Nose: Nose normal.      Mouth/Throat:      Lips: Pink. Mouth: Mucous membranes are moist.      Pharynx: Oropharynx is clear. Uvula midline. Eyes:      Conjunctiva/sclera: Conjunctivae normal.      Pupils: Pupils are equal, round, and reactive to light. Neck:      Musculoskeletal: Normal range of motion and neck supple. Cardiovascular:      Rate and Rhythm: Normal rate and regular rhythm. Pulmonary:      Effort: Pulmonary effort is normal.      Breath sounds: Normal breath sounds. Abdominal:      General: Bowel sounds are normal. There is no distension. Palpations: Abdomen is soft. Tenderness: There is no abdominal tenderness. Musculoskeletal: Normal range of motion. Skin:     General: Skin is warm and dry. Neurological:      Mental Status: She is alert and oriented to person, place, and time. Deep Tendon Reflexes: Reflexes are normal and symmetric. Psychiatric:         Judgment: Judgment normal.           All other labs were within normal range or not returned as of this dictation.     EMERGENCY DEPARTMENT COURSE and DIFFERENTIALDIAGNOSIS/MDM:   Vitals:    Vitals:    04/16/21 1004   BP: 106/67   Pulse: 86   Resp: 18   Temp: 98.1 °F (36.7 °C)   TempSrc: Oral   SpO2: 97%   Weight: 150 lb (68 kg) Height: 5' 7\" (1.702 m)            28 yr old female with jaw mass. F/U with ENT ASAP. Patient is comfortable at discharge and verbalizes understanding. PROCEDURES:  Unless otherwise noted below, none     Procedures      FINAL IMPRESSION      1.  Jaw mass          DISPOSITION/PLAN   DISPOSITION Decision To Discharge 04/16/2021 10:56:06 AM          LUCY Cotto CNP (electronically signed)  Attending Emergency Physician     LUCY Cotto CNP  04/16/21 [id number]

## 2021-05-10 ENCOUNTER — HOSPITAL ENCOUNTER (OUTPATIENT)
Dept: CT IMAGING | Age: 31
Discharge: HOME | End: 2021-05-12
Payer: MEDICAID

## 2021-05-10 VITALS — BODY MASS INDEX: 24.99 KG/M2 | HEIGHT: 65 IN | WEIGHT: 150 LBS

## 2021-05-10 DIAGNOSIS — K11.20 SIALOADENITIS: ICD-10-CM

## 2021-05-10 PROCEDURE — 6360000004 HC RX CONTRAST MEDICATION: Performed by: RADIOLOGY

## 2021-05-10 PROCEDURE — 70492 CT SFT TSUE NCK W/O & W/DYE: CPT

## 2021-05-10 RX ORDER — SODIUM CHLORIDE 0.9 % (FLUSH) 0.9 %
10 SYRINGE (ML) INJECTION
Status: DISPENSED | OUTPATIENT
Start: 2021-05-10 | End: 2021-05-10

## 2021-05-10 RX ADMIN — IOPAMIDOL 100 ML: 612 INJECTION, SOLUTION INTRAVENOUS at 13:40

## 2021-05-31 ENCOUNTER — PATIENT MESSAGE (OUTPATIENT)
Dept: FAMILY MEDICINE CLINIC | Age: 31
End: 2021-05-31

## 2021-06-01 NOTE — TELEPHONE ENCOUNTER
From: Rob Lawrence  To: Angel Machado MD  Sent: 5/31/2021 2:47 PM EDT  Subject: Test Results Question    Hello, I recently had a CT of the head and Neck with contrast. Can a copy be sent to my home address &/or Available for ermias Brewer.

## 2022-12-14 ENCOUNTER — HOSPITAL ENCOUNTER (EMERGENCY)
Age: 32
Discharge: HOME | End: 2022-12-15
Payer: MEDICAID

## 2022-12-14 VITALS
HEART RATE: 99 BPM | DIASTOLIC BLOOD PRESSURE: 75 MMHG | SYSTOLIC BLOOD PRESSURE: 127 MMHG | BODY MASS INDEX: 29.51 KG/M2 | OXYGEN SATURATION: 99 % | TEMPERATURE: 98.3 F | WEIGHT: 188 LBS | HEIGHT: 67 IN | RESPIRATION RATE: 16 BRPM

## 2022-12-14 DIAGNOSIS — J95.830 POST-TONSILLECTOMY HEMORRHAGE: Primary | ICD-10-CM

## 2022-12-14 PROCEDURE — 99283 EMERGENCY DEPT VISIT LOW MDM: CPT

## 2022-12-14 ASSESSMENT — ENCOUNTER SYMPTOMS
BLOOD IN STOOL: 0
DIARRHEA: 0
SHORTNESS OF BREATH: 0
NAUSEA: 0
VOMITING: 0
SORE THROAT: 0
COLOR CHANGE: 0
COUGH: 0
RHINORRHEA: 0
ABDOMINAL PAIN: 0

## 2022-12-14 ASSESSMENT — PAIN - FUNCTIONAL ASSESSMENT: PAIN_FUNCTIONAL_ASSESSMENT: NONE - DENIES PAIN

## 2022-12-15 NOTE — ED PROVIDER NOTES
[address] [facility] ED  eMERGENCY dEPARTMENT eNCOUnter      Pt Name: Anne Venegas  MRN: [id number]  [place] 1990  Date of evaluation: 12/14/2022  Provider: Rickey Bangura, [address]    Anne Venegas is a 33 y.o. adult with PMHx of depression, GERD, insomnia, testicular atrophy, gender dysphoria, transgender MALE to FEMALE presents to the emergency department with post tonsillectomy bleeding. Patient had tonsillectomy December 8 at [facility], 6 days status post, due to frequent sore throat/tonsillitis, and sialolithiasis, no complications. She has been having postnasal drip since around 2 PM.  Prior to arrival she felt a \"gush of blood\" coming from left tonsil area and had bleeding lasting 1 to 2 minutes. Put cotton balls on the area and applied pressure for 1 minute which slowed down the bleeding. No current bleeding. No blood thinners. She denies lightheaded or dizziness, fevers, respiratory symptoms. HPI    Nursing Notes were reviewed. REVIEW OF SYSTEMS       Review of Systems   Constitutional:  Negative for appetite change, chills and fever. HENT:  Positive for postnasal drip. Negative for congestion, rhinorrhea and sore throat. Respiratory:  Negative for cough and shortness of breath. Cardiovascular:  Negative for chest pain. Gastrointestinal:  Negative for abdominal pain, blood in stool, diarrhea, nausea and vomiting. Genitourinary:  Negative for difficulty urinating. Musculoskeletal:  Negative for neck stiffness. Skin:  Negative for color change and rash. Neurological:  Negative for dizziness, syncope, weakness, light-headedness, numbness and headaches. All other systems reviewed and are negative.           PAST MEDICAL HISTORY     Past Medical History:   Diagnosis Date    Depression     GERD (gastroesophageal reflux disease)     Insomnia     Staph infection     Testicular atrophy          SURGICAL HISTORY       Past Surgical History: Procedure Laterality Date    SINUS SURGERY  2016         CURRENT MEDICATIONS       Previous Medications    AZELASTINE (ASTELIN) 0.1 % NASAL SPRAY    1 spray by Nasal route 2 times daily Use in each nostril as directed    DOCUSATE SODIUM (COLACE) 100 MG CAPSULE    Take 100 mg by mouth 2 times daily    ESTRADIOL (ESTRACE) 2 MG TABLET    Take 2 mg by mouth 2 times daily    FLUTICASONE (FLONASE) 50 MCG/ACT NASAL SPRAY    1 spray by NOT APPLICABLE route daily    IBUPROFEN (IBU) 600 MG TABLET    Take 1 tablet by mouth every 6 hours as needed for Pain    LIDOCAINE 5 % CREA    APPLY TO AFFECTED AREA AS NEEDED AS DIRECTED    LIDOCAINE, ANORECTAL, 5 % CREA    APPLY TO AFFECTED AREA AS NEEDED AS DIRECTED    OMEPRAZOLE (PRILOSEC) 20 MG DELAYED RELEASE CAPSULE        PROGESTERONE (PROMETRIUM) 100 MG CAPS CAPSULE    Take 100 mg by mouth daily    SALICYLIC ACID-LACTIC ACID (COMPOUND W) 17 % EXTERNAL SOLUTION    Apply topically. SERTRALINE (ZOLOFT) 100 MG TABLET        SPIRONOLACTONE (ALDACTONE) 100 MG TABLET    Take 100 mg by mouth 2 times daily       ALLERGIES     Patient has no known allergies. FAMILY HISTORY       Family History   Problem Relation Age of Onset    Pancreatic Cancer Maternal Grandfather     Prostate Cancer Maternal Grandfather     Diabetes Maternal Grandfather     Diabetes Paternal Grandmother           SOCIAL HISTORY       Social History     Socioeconomic History    Marital status: Single   Tobacco Use    Smoking status: Never    Smokeless tobacco: Never   Vaping Use    Vaping Use: Never used   Substance and Sexual Activity    Alcohol use: Yes    Drug use: No    Sexual activity: Not Currently     Partners: Female         PHYSICAL EXAM         ED Triage Vitals [12/14/22 2311]   BP Temp Temp Source Heart Rate Resp SpO2 Height Weight   127/75 98.3 °F (36.8 °C) Oral 99 16 99 % 5' 7\" (1.702 m) 188 lb (85.3 kg)       Physical Exam  Constitutional:       Appearance: She is well-developed.    HENT:      Head: Normocephalic and atraumatic. Mouth/Throat:      Comments: White eschar noted to right tonsil area with dried blood noted to left tonsil area, no active bleeding or airway compromise  Eyes:      Conjunctiva/sclera: Conjunctivae normal.      Pupils: Pupils are equal, round, and reactive to light. Neck:      Trachea: No tracheal deviation. Cardiovascular:      Heart sounds: Normal heart sounds. Pulmonary:      Effort: Pulmonary effort is normal. No respiratory distress. Breath sounds: Normal breath sounds. No stridor. Abdominal:      General: Bowel sounds are normal. There is no distension. Palpations: Abdomen is soft. There is no mass. Tenderness: There is no abdominal tenderness. There is no guarding or rebound. Musculoskeletal:         General: Normal range of motion. Cervical back: Normal range of motion and neck supple. Skin:     General: Skin is warm and dry. Capillary Refill: Capillary refill takes less than 2 seconds. Findings: No rash. Neurological:      Mental Status: She is alert and oriented to person, place, and time. Deep Tendon Reflexes: Reflexes are normal and symmetric. Psychiatric:         Behavior: Behavior normal.         Thought Content: Thought content normal.         Judgment: Judgment normal.       DIAGNOSTIC RESULTS     EKG:All EKG's are interpreted by the Emergency Department Physician who either signs or Co-signs this chart in the absence of a cardiologist.        RADIOLOGY:   Non-plain film images such as CT, Ultrasound and MRI are read by theradiologist. Plain radiographic images are visualized and preliminarily interpreted by the emergency physician with the below findings:    Interpretation per theRadiologist below, if available at the time of this note:    No orders to display           LABS:  Labs Reviewed - No data to display    All other labs were within normal range or not returned as of this dictation.     EMERGENCY DEPARTMENT COURSE and DIFFERENTIAL DIAGNOSIS/MDM:   Vitals:    Vitals:    12/14/22 2311   BP: 127/75   Pulse: 99   Resp: 16   Temp: 98.3 °F (36.8 °C)   TempSrc: Oral   SpO2: 99%   Weight: 188 lb (85.3 kg)   Height: 5' 7\" (1.702 m)         MDM    Patient OBS in emergency room for 1 hour without bleeding. Patient was made aware if she does start with bleeding at home to try applying pressure again, and to gargle ice water. Hemodynamically stable. Standard anticipatory guidance given to patient upon discharge. Have given them a specific time frame in which to follow-up and who to follow-up with. I have also advised them that they should return to the emergency department if they get worse, or not getting better or develop any new or concerning symptoms. Patient demonstrates understanding. CRITICAL CARE TIME   Total Critical Caretime was 0 minutes, excluding separately reportable procedures. There was a high probability of clinically significant/life threatening deterioration in the patient's condition which required my urgent intervention. Procedures    FINAL IMPRESSION      1. Post-tonsillectomy hemorrhage          DISPOSITION/PLAN   DISPOSITION Decision To Discharge 12/15/2022 12:13:38 AM      PATIENT REFERRED TO:  Follow up with ENT as scheduled          DISCHARGE MEDICATIONS:  New Prescriptions    No medications on file          (Please notethat portions of this note were completed with a voice recognition program.  Efforts were made to edit the dictations but occasionally words are mis-transcribed. )    MORGAN Butterfield (electronically signed)  Emergency Physician Assistant          [place], [place]  12/15/22 0401

## 2022-12-15 NOTE — ED NOTES
Pt had tonsils removed 12/08/22. Pt states feeling \"warm sensations and then a gush of blood\". Pt is A&Ox4. Afebrile with skin warm, dry, and intact. Respirations equal and unlabored.       Priya Granados, KATHERIN  12/15/22 0020